# Patient Record
Sex: MALE | Employment: UNEMPLOYED | URBAN - METROPOLITAN AREA
[De-identification: names, ages, dates, MRNs, and addresses within clinical notes are randomized per-mention and may not be internally consistent; named-entity substitution may affect disease eponyms.]

---

## 2018-06-08 ENCOUNTER — APPOINTMENT (OUTPATIENT)
Dept: ULTRASOUND IMAGING | Age: 43
DRG: 872 | End: 2018-06-08
Attending: HOSPITALIST
Payer: SUBSIDIZED

## 2018-06-08 ENCOUNTER — APPOINTMENT (OUTPATIENT)
Dept: GENERAL RADIOLOGY | Age: 43
DRG: 872 | End: 2018-06-08
Attending: EMERGENCY MEDICINE
Payer: SUBSIDIZED

## 2018-06-08 ENCOUNTER — HOSPITAL ENCOUNTER (INPATIENT)
Age: 43
LOS: 2 days | Discharge: HOME | DRG: 872 | End: 2018-06-10
Attending: EMERGENCY MEDICINE | Admitting: HOSPITALIST
Payer: SUBSIDIZED

## 2018-06-08 ENCOUNTER — APPOINTMENT (OUTPATIENT)
Dept: MRI IMAGING | Age: 43
DRG: 872 | End: 2018-06-08
Attending: HOSPITALIST
Payer: SUBSIDIZED

## 2018-06-08 DIAGNOSIS — L03.116 CELLULITIS OF LEFT LOWER EXTREMITY: Primary | ICD-10-CM

## 2018-06-08 DIAGNOSIS — R03.0 ELEVATED BLOOD PRESSURE READING: ICD-10-CM

## 2018-06-08 PROBLEM — S92.902A FOOT FRACTURE, LEFT: Status: ACTIVE | Noted: 2018-06-08

## 2018-06-08 LAB
ALBUMIN SERPL-MCNC: 1.6 G/DL (ref 3.5–5)
ALBUMIN/GLOB SERPL: 0.4 {RATIO} (ref 1.1–2.2)
ALP SERPL-CCNC: 138 U/L (ref 45–117)
ALT SERPL-CCNC: 15 U/L (ref 12–78)
AMPHET UR QL SCN: NEGATIVE
ANION GAP SERPL CALC-SCNC: 8 MMOL/L (ref 5–15)
APPEARANCE UR: CLEAR
AST SERPL-CCNC: 21 U/L (ref 15–37)
BACTERIA URNS QL MICRO: NEGATIVE /HPF
BARBITURATES UR QL SCN: NEGATIVE
BASOPHILS # BLD: 0.1 K/UL (ref 0–0.1)
BASOPHILS NFR BLD: 1 % (ref 0–1)
BENZODIAZ UR QL: NEGATIVE
BILIRUB SERPL-MCNC: 0.3 MG/DL (ref 0.2–1)
BILIRUB UR QL: NEGATIVE
BUN SERPL-MCNC: 18 MG/DL (ref 6–20)
BUN/CREAT SERPL: 14 (ref 12–20)
CALCIUM SERPL-MCNC: 8.3 MG/DL (ref 8.5–10.1)
CANNABINOIDS UR QL SCN: NEGATIVE
CHLORIDE SERPL-SCNC: 109 MMOL/L (ref 97–108)
CO2 SERPL-SCNC: 25 MMOL/L (ref 21–32)
COCAINE UR QL SCN: NEGATIVE
COLOR UR: ABNORMAL
CREAT SERPL-MCNC: 1.3 MG/DL (ref 0.7–1.3)
DIFFERENTIAL METHOD BLD: ABNORMAL
DRUG SCRN COMMENT,DRGCM: NORMAL
EOSINOPHIL # BLD: 0.4 K/UL (ref 0–0.4)
EOSINOPHIL NFR BLD: 3 % (ref 0–7)
EPITH CASTS URNS QL MICRO: ABNORMAL /LPF
ERYTHROCYTE [DISTWIDTH] IN BLOOD BY AUTOMATED COUNT: 15.5 % (ref 11.5–14.5)
GLOBULIN SER CALC-MCNC: 4.1 G/DL (ref 2–4)
GLUCOSE BLD STRIP.AUTO-MCNC: 136 MG/DL (ref 65–100)
GLUCOSE BLD STRIP.AUTO-MCNC: 162 MG/DL (ref 65–100)
GLUCOSE BLD STRIP.AUTO-MCNC: 171 MG/DL (ref 65–100)
GLUCOSE SERPL-MCNC: 180 MG/DL (ref 65–100)
GLUCOSE UR STRIP.AUTO-MCNC: 250 MG/DL
HCT VFR BLD AUTO: 38.9 % (ref 36.6–50.3)
HGB BLD-MCNC: 12.6 G/DL (ref 12.1–17)
HGB UR QL STRIP: ABNORMAL
IMM GRANULOCYTES # BLD: 0.1 K/UL (ref 0–0.04)
IMM GRANULOCYTES NFR BLD AUTO: 0 % (ref 0–0.5)
KETONES UR QL STRIP.AUTO: NEGATIVE MG/DL
LACTATE SERPL-SCNC: 0.8 MMOL/L (ref 0.4–2)
LEUKOCYTE ESTERASE UR QL STRIP.AUTO: NEGATIVE
LYMPHOCYTES # BLD: 1.8 K/UL (ref 0.8–3.5)
LYMPHOCYTES NFR BLD: 13 % (ref 12–49)
MCH RBC QN AUTO: 29.4 PG (ref 26–34)
MCHC RBC AUTO-ENTMCNC: 32.4 G/DL (ref 30–36.5)
MCV RBC AUTO: 90.7 FL (ref 80–99)
METHADONE UR QL: NEGATIVE
MONOCYTES # BLD: 1.4 K/UL (ref 0–1)
MONOCYTES NFR BLD: 10 % (ref 5–13)
NEUTS SEG # BLD: 10.3 K/UL (ref 1.8–8)
NEUTS SEG NFR BLD: 73 % (ref 32–75)
NITRITE UR QL STRIP.AUTO: NEGATIVE
NRBC # BLD: 0 K/UL (ref 0–0.01)
NRBC BLD-RTO: 0 PER 100 WBC
OPIATES UR QL: NEGATIVE
PCP UR QL: NEGATIVE
PH UR STRIP: 6.5 [PH] (ref 5–8)
PLATELET # BLD AUTO: 285 K/UL (ref 150–400)
PMV BLD AUTO: 10.9 FL (ref 8.9–12.9)
POTASSIUM SERPL-SCNC: 3.9 MMOL/L (ref 3.5–5.1)
PROT SERPL-MCNC: 5.7 G/DL (ref 6.4–8.2)
PROT UR STRIP-MCNC: >300 MG/DL
RBC # BLD AUTO: 4.29 M/UL (ref 4.1–5.7)
RBC #/AREA URNS HPF: ABNORMAL /HPF (ref 0–5)
SERVICE CMNT-IMP: ABNORMAL
SODIUM SERPL-SCNC: 142 MMOL/L (ref 136–145)
SP GR UR REFRACTOMETRY: 1.02 (ref 1–1.03)
UA: UC IF INDICATED,UAUC: ABNORMAL
UROBILINOGEN UR QL STRIP.AUTO: 0.2 EU/DL (ref 0.2–1)
WBC # BLD AUTO: 14.1 K/UL (ref 4.1–11.1)
WBC URNS QL MICRO: ABNORMAL /HPF (ref 0–4)

## 2018-06-08 PROCEDURE — 36415 COLL VENOUS BLD VENIPUNCTURE: CPT

## 2018-06-08 PROCEDURE — 74011250636 HC RX REV CODE- 250/636: Performed by: EMERGENCY MEDICINE

## 2018-06-08 PROCEDURE — 74011250637 HC RX REV CODE- 250/637: Performed by: EMERGENCY MEDICINE

## 2018-06-08 PROCEDURE — 80307 DRUG TEST PRSMV CHEM ANLYZR: CPT | Performed by: HOSPITALIST

## 2018-06-08 PROCEDURE — 74011000258 HC RX REV CODE- 258: Performed by: HOSPITALIST

## 2018-06-08 PROCEDURE — 65660000000 HC RM CCU STEPDOWN

## 2018-06-08 PROCEDURE — 85025 COMPLETE CBC W/AUTO DIFF WBC: CPT

## 2018-06-08 PROCEDURE — 74011000250 HC RX REV CODE- 250: Performed by: EMERGENCY MEDICINE

## 2018-06-08 PROCEDURE — 82962 GLUCOSE BLOOD TEST: CPT

## 2018-06-08 PROCEDURE — 74011250636 HC RX REV CODE- 250/636: Performed by: HOSPITALIST

## 2018-06-08 PROCEDURE — 74011250637 HC RX REV CODE- 250/637: Performed by: HOSPITALIST

## 2018-06-08 PROCEDURE — A9575 INJ GADOTERATE MEGLUMI 0.1ML: HCPCS | Performed by: EMERGENCY MEDICINE

## 2018-06-08 PROCEDURE — 93971 EXTREMITY STUDY: CPT

## 2018-06-08 PROCEDURE — 87040 BLOOD CULTURE FOR BACTERIA: CPT

## 2018-06-08 PROCEDURE — 81001 URINALYSIS AUTO W/SCOPE: CPT | Performed by: HOSPITALIST

## 2018-06-08 PROCEDURE — 99285 EMERGENCY DEPT VISIT HI MDM: CPT

## 2018-06-08 PROCEDURE — 73630 X-RAY EXAM OF FOOT: CPT

## 2018-06-08 PROCEDURE — 83605 ASSAY OF LACTIC ACID: CPT

## 2018-06-08 PROCEDURE — 73720 MRI LWR EXTREMITY W/O&W/DYE: CPT

## 2018-06-08 PROCEDURE — 96375 TX/PRO/DX INJ NEW DRUG ADDON: CPT

## 2018-06-08 PROCEDURE — 80053 COMPREHEN METABOLIC PANEL: CPT

## 2018-06-08 PROCEDURE — 96365 THER/PROPH/DIAG IV INF INIT: CPT

## 2018-06-08 RX ORDER — MAGNESIUM SULFATE 100 %
4 CRYSTALS MISCELLANEOUS AS NEEDED
Status: DISCONTINUED | OUTPATIENT
Start: 2018-06-08 | End: 2018-06-10 | Stop reason: HOSPADM

## 2018-06-08 RX ORDER — VALSARTAN 40 MG/1
40 TABLET ORAL DAILY
Status: DISCONTINUED | OUTPATIENT
Start: 2018-06-09 | End: 2018-06-10 | Stop reason: HOSPADM

## 2018-06-08 RX ORDER — METOPROLOL TARTRATE 50 MG/1
100 TABLET ORAL EVERY 8 HOURS
Status: DISCONTINUED | OUTPATIENT
Start: 2018-06-08 | End: 2018-06-08

## 2018-06-08 RX ORDER — SODIUM CHLORIDE 0.9 % (FLUSH) 0.9 %
5-10 SYRINGE (ML) INJECTION AS NEEDED
Status: DISCONTINUED | OUTPATIENT
Start: 2018-06-08 | End: 2018-06-10 | Stop reason: HOSPADM

## 2018-06-08 RX ORDER — DOCUSATE SODIUM 100 MG/1
100 CAPSULE, LIQUID FILLED ORAL 2 TIMES DAILY
Status: DISCONTINUED | OUTPATIENT
Start: 2018-06-08 | End: 2018-06-10 | Stop reason: HOSPADM

## 2018-06-08 RX ORDER — ACETAMINOPHEN 325 MG/1
650 TABLET ORAL
Status: DISCONTINUED | OUTPATIENT
Start: 2018-06-08 | End: 2018-06-10 | Stop reason: HOSPADM

## 2018-06-08 RX ORDER — METRONIDAZOLE 500 MG/100ML
500 INJECTION, SOLUTION INTRAVENOUS
Status: COMPLETED | OUTPATIENT
Start: 2018-06-08 | End: 2018-06-08

## 2018-06-08 RX ORDER — METOPROLOL TARTRATE 50 MG/1
50 TABLET ORAL
Status: COMPLETED | OUTPATIENT
Start: 2018-06-08 | End: 2018-06-08

## 2018-06-08 RX ORDER — CLINDAMYCIN PHOSPHATE 900 MG/50ML
900 INJECTION INTRAVENOUS
Status: COMPLETED | OUTPATIENT
Start: 2018-06-08 | End: 2018-06-08

## 2018-06-08 RX ORDER — HYDRALAZINE HYDROCHLORIDE 20 MG/ML
20 INJECTION INTRAMUSCULAR; INTRAVENOUS
Status: COMPLETED | OUTPATIENT
Start: 2018-06-08 | End: 2018-06-08

## 2018-06-08 RX ORDER — VANCOMYCIN HYDROCHLORIDE
1250 EVERY 12 HOURS
Status: DISCONTINUED | OUTPATIENT
Start: 2018-06-09 | End: 2018-06-10

## 2018-06-08 RX ORDER — SODIUM CHLORIDE 0.9 % (FLUSH) 0.9 %
5-10 SYRINGE (ML) INJECTION EVERY 8 HOURS
Status: DISCONTINUED | OUTPATIENT
Start: 2018-06-08 | End: 2018-06-10 | Stop reason: HOSPADM

## 2018-06-08 RX ORDER — METFORMIN HYDROCHLORIDE 850 MG/1
850 TABLET ORAL DAILY
COMMUNITY
End: 2019-06-10 | Stop reason: ALTCHOICE

## 2018-06-08 RX ORDER — OXYCODONE HYDROCHLORIDE 5 MG/1
5 TABLET ORAL
Status: COMPLETED | OUTPATIENT
Start: 2018-06-08 | End: 2018-06-08

## 2018-06-08 RX ORDER — HYDRALAZINE HYDROCHLORIDE 20 MG/ML
20 INJECTION INTRAMUSCULAR; INTRAVENOUS
Status: DISCONTINUED | OUTPATIENT
Start: 2018-06-08 | End: 2018-06-10 | Stop reason: HOSPADM

## 2018-06-08 RX ORDER — ENOXAPARIN SODIUM 100 MG/ML
40 INJECTION SUBCUTANEOUS EVERY 24 HOURS
Status: DISCONTINUED | OUTPATIENT
Start: 2018-06-08 | End: 2018-06-10 | Stop reason: HOSPADM

## 2018-06-08 RX ORDER — GLYBURIDE 5 MG/1
2.5 TABLET ORAL 2 TIMES DAILY
Status: DISCONTINUED | OUTPATIENT
Start: 2018-06-08 | End: 2018-06-10 | Stop reason: HOSPADM

## 2018-06-08 RX ORDER — CARVEDILOL 25 MG/1
25 TABLET ORAL 2 TIMES DAILY
COMMUNITY
End: 2019-06-10 | Stop reason: SDUPTHER

## 2018-06-08 RX ORDER — SODIUM CHLORIDE 9 MG/ML
100 INJECTION, SOLUTION INTRAVENOUS CONTINUOUS
Status: DISPENSED | OUTPATIENT
Start: 2018-06-08 | End: 2018-06-09

## 2018-06-08 RX ORDER — DIPHENHYDRAMINE HCL 25 MG
25 TABLET ORAL
COMMUNITY
End: 2019-06-10

## 2018-06-08 RX ORDER — DEXTROSE 50 % IN WATER (D50W) INTRAVENOUS SYRINGE
12.5-25 AS NEEDED
Status: DISCONTINUED | OUTPATIENT
Start: 2018-06-08 | End: 2018-06-10 | Stop reason: HOSPADM

## 2018-06-08 RX ORDER — INSULIN LISPRO 100 [IU]/ML
INJECTION, SOLUTION INTRAVENOUS; SUBCUTANEOUS
Status: DISCONTINUED | OUTPATIENT
Start: 2018-06-08 | End: 2018-06-10 | Stop reason: HOSPADM

## 2018-06-08 RX ORDER — TRAMADOL HYDROCHLORIDE 50 MG/1
50 TABLET ORAL
Status: COMPLETED | OUTPATIENT
Start: 2018-06-08 | End: 2018-06-08

## 2018-06-08 RX ORDER — METOPROLOL TARTRATE 50 MG/1
50 TABLET ORAL EVERY 8 HOURS
Status: DISCONTINUED | OUTPATIENT
Start: 2018-06-08 | End: 2018-06-09

## 2018-06-08 RX ORDER — GADOTERATE MEGLUMINE 376.9 MG/ML
20 INJECTION INTRAVENOUS
Status: COMPLETED | OUTPATIENT
Start: 2018-06-08 | End: 2018-06-08

## 2018-06-08 RX ORDER — ONDANSETRON 2 MG/ML
4 INJECTION INTRAMUSCULAR; INTRAVENOUS
Status: DISCONTINUED | OUTPATIENT
Start: 2018-06-08 | End: 2018-06-09

## 2018-06-08 RX ORDER — FUROSEMIDE 20 MG/1
20 TABLET ORAL DAILY
Status: ON HOLD | COMMUNITY
End: 2018-06-10

## 2018-06-08 RX ORDER — IBUPROFEN 200 MG
1 TABLET ORAL DAILY
Status: DISCONTINUED | OUTPATIENT
Start: 2018-06-09 | End: 2018-06-10 | Stop reason: HOSPADM

## 2018-06-08 RX ORDER — OXYCODONE HYDROCHLORIDE 5 MG/1
10 TABLET ORAL
Status: DISCONTINUED | OUTPATIENT
Start: 2018-06-08 | End: 2018-06-10 | Stop reason: HOSPADM

## 2018-06-08 RX ORDER — SULFAMETHOXAZOLE AND TRIMETHOPRIM 800; 160 MG/1; MG/1
1 TABLET ORAL 2 TIMES DAILY
COMMUNITY
End: 2018-06-10

## 2018-06-08 RX ADMIN — METOPROLOL TARTRATE 50 MG: 50 TABLET ORAL at 21:17

## 2018-06-08 RX ADMIN — VANCOMYCIN HYDROCHLORIDE 2500 MG: 10 INJECTION, POWDER, LYOPHILIZED, FOR SOLUTION INTRAVENOUS at 17:21

## 2018-06-08 RX ADMIN — OXYCODONE HYDROCHLORIDE 5 MG: 5 TABLET ORAL at 10:12

## 2018-06-08 RX ADMIN — METRONIDAZOLE 500 MG: 500 INJECTION, SOLUTION INTRAVENOUS at 11:43

## 2018-06-08 RX ADMIN — CLINDAMYCIN PHOSPHATE 900 MG: 18 INJECTION, SOLUTION INTRAMUSCULAR; INTRAVENOUS at 10:12

## 2018-06-08 RX ADMIN — DOCUSATE SODIUM 100 MG: 100 CAPSULE, LIQUID FILLED ORAL at 15:22

## 2018-06-08 RX ADMIN — ENOXAPARIN SODIUM 40 MG: 100 INJECTION SUBCUTANEOUS at 15:23

## 2018-06-08 RX ADMIN — METOPROLOL TARTRATE 50 MG: 50 TABLET ORAL at 10:12

## 2018-06-08 RX ADMIN — Medication 10 ML: at 21:17

## 2018-06-08 RX ADMIN — Medication 10 ML: at 15:23

## 2018-06-08 RX ADMIN — GADOTERATE MEGLUMINE 20 ML: 376.9 INJECTION INTRAVENOUS at 14:36

## 2018-06-08 RX ADMIN — GLYBURIDE 2.5 MG: 5 TABLET ORAL at 17:25

## 2018-06-08 RX ADMIN — PIPERACILLIN SODIUM,TAZOBACTAM SODIUM 3.38 G: 3; .375 INJECTION, POWDER, FOR SOLUTION INTRAVENOUS at 22:58

## 2018-06-08 RX ADMIN — Medication 10 ML: at 18:57

## 2018-06-08 RX ADMIN — HYDRALAZINE HYDROCHLORIDE 20 MG: 20 INJECTION INTRAMUSCULAR; INTRAVENOUS at 18:57

## 2018-06-08 RX ADMIN — TRAMADOL HYDROCHLORIDE 50 MG: 50 TABLET, FILM COATED ORAL at 11:42

## 2018-06-08 RX ADMIN — OXYCODONE HYDROCHLORIDE 10 MG: 5 TABLET ORAL at 21:17

## 2018-06-08 RX ADMIN — PIPERACILLIN SODIUM,TAZOBACTAM SODIUM 3.38 G: 3; .375 INJECTION, POWDER, FOR SOLUTION INTRAVENOUS at 15:32

## 2018-06-08 RX ADMIN — METOPROLOL TARTRATE 50 MG: 50 TABLET ORAL at 15:39

## 2018-06-08 RX ADMIN — HYDRALAZINE HYDROCHLORIDE 20 MG: 20 INJECTION INTRAMUSCULAR; INTRAVENOUS at 11:42

## 2018-06-08 RX ADMIN — OXYCODONE HYDROCHLORIDE 10 MG: 5 TABLET ORAL at 15:22

## 2018-06-08 RX ADMIN — SODIUM CHLORIDE 100 ML/HR: 900 INJECTION, SOLUTION INTRAVENOUS at 15:28

## 2018-06-08 RX ADMIN — DOCUSATE SODIUM 100 MG: 100 CAPSULE, LIQUID FILLED ORAL at 17:25

## 2018-06-08 NOTE — IP AVS SNAPSHOT
[address] [facility] 
[phone] Patient: Rocky Riggs MRN: [id number] NABOR:[id number] About your hospitalization You were admitted on:  June 8, 2018 You last received care in the:  [facility] CARDIOPULMONARY CARE You were discharged on:  Amanda 10, 2018 Why you were hospitalized Your primary diagnosis was:  Left Leg Cellulitis Your diagnoses also included:  Accelerated Hypertension, Foot Fracture, Left, Sepsis (Hcc), Allen (Acute Kidney Injury) ([facility]) Follow-up Information Follow up With Details Comments Contact Info None In 1 week Please call PCP office of your choice to schedule new patient appointment. None [phone] Patient stated that they have no PCP Hamida Resendez DPM In 4 days Please schedule Podiatry follow-up appointment. [facility] [address] 
[phone] Discharge Orders None A check guadalupe indicates which time of day the medication should be taken. My Medications START taking these medications Instructions Each Dose to Equal  
 Morning Noon Evening Bedtime  
 amoxicillin-clavulanate 500-125 mg per tablet Commonly known as:  AUGMENTIN Your last dose was: Your next dose is: Take 1 Tab by mouth two (2) times a day for 5 days. 1 Tab CHANGE how you take these medications Instructions Each Dose to Equal  
 Morning Noon Evening Bedtime  
 furosemide 40 mg tablet Commonly known as:  LASIX What changed:   
- medication strength 
- how much to take Your last dose was: Your next dose is: Take 1 Tab by mouth daily. 40 mg CONTINUE taking these medications Instructions Each Dose to Equal  
 Morning Noon Evening Bedtime BENADRYL ALLERGY 25 mg tablet Generic drug:  diphenhydrAMINE Your last dose was: Your next dose is: Take 25 mg by mouth two (2) times a day. 25 mg  
    
   
   
   
  
 carvedilol 25 mg tablet Commonly known as:  Mariola Arenas Your last dose was: Your next dose is: Take 25 mg by mouth daily. 25 mg  
    
   
   
   
  
 metFORMIN 850 mg tablet Commonly known as:  GLUCOPHAGE Your last dose was: Your next dose is: Take 850 mg by mouth two (2) times daily (with meals). 850 mg  
    
   
   
   
  
  
STOP taking these medications BACTRIM -800 mg per tablet Generic drug:  trimethoprim-sulfamethoxazole Where to Get Your Medications Information on where to get these meds will be given to you by the nurse or doctor. ! Ask your nurse or doctor about these medications  
  amoxicillin-clavulanate 500-125 mg per tablet  
 furosemide 40 mg tablet Discharge Instructions HOSPITALIST DISCHARGE INSTRUCTIONS 
 
NAME: Rosita Dent :  1975 MRN:  [id number] Date/Time:  6/10/2018 9:41 AM 
 
ADMIT DATE: 2018 DISCHARGE DATE: 6/10/2018 DISCHARGE DIAGNOSIS: 
Left lower leg and foot cellulitis: improved Charcot foot Osteomyelitis ruled out Sepsis, resolved Accelerated hypertension, poa 
CKD stage II/III   
DM type 2, A1c 6.8 Hepatitis C Tobacco abuse Hx alcohol dependence MEDICATIONS: 
As per medication reconciliation  list 
· It is important that you take the medication exactly as they are prescribed. · Keep your medication in the bottles provided by the pharmacist and keep a list of the medication names, dosages, and times to be taken in your wallet. · Do not take other medications without consulting your doctor. · Also get an over the counter probiotic to help prevent antibiotic associated diarrhea Pain Management: Use tylenol (acetaminophen as needed for pain) What to do at [facility] Recommended diet:  Diabetic Diet Recommended activity: Activity as tolerated If you have questions regarding the hospital related prescriptions or hospital related issues please call Jah James at . If you experience any of the following symptoms then please call your primary care physician or return to the emergency room if you cannot get hold of your doctor: 
Fever, chills, nausea, vomiting, diarrhea, change in mentation, falling, bleeding, shortness of breath or any other concerning symptoms. Follow Up: 
Complete you [facility] care card application and establish first available care with a PCP. Dr. Mary Thompson would like to see you in her office next week. I have given you her information. Please get your RANCHO reports from [facility] prior to this appointment. Information obtained by : 
I understand that if any problems occur once I am at home I am to contact my physician. I understand and acknowledge receipt of the instructions indicated above. Physician's or R.N.'s Signature                                                                  Date/Time Patient or Representative Signature                                                          Date/Time [employer] Announcement We are excited to announce that we are making your provider's discharge notes available to you in [employer]. You will see these notes when they are completed and signed by the physician that discharged you from your recent hospital stay.   If you have any questions or concerns about any information you see in [employer], please call the [facility] Department where you were seen or reach out to your Primary Care Provider for more information about your plan of care. Introducing [facility]! Leland Leon introduces [employer] patient portal. Now you can access parts of your medical record, email your doctor's office, and request medication refills online. 1. In your internet browser, go to [email or web address] 2. Click on the First Time User? Click Here link in the Sign In box. You will see the New Member Sign Up page. 3. Enter your [employer] Access Code exactly as it appears below. You will not need to use this code after youve completed the sign-up process. If you do not sign up before the expiration date, you must request a new code. · [employer] Access Code: [email or web address] Expires: 9/6/2018  8:31 AM 
 
4. Enter the last four digits of your Social Security Number (xxxx) and Date of Birth (mm/dd/yyyy) as indicated and click Submit. You will be taken to the next sign-up page. 5. Create a [employer] ID. This will be your [employer] login ID and cannot be changed, so think of one that is secure and easy to remember. 6. Create a [employer] password. You can change your password at any time. 7. Enter your Password Reset Question and Answer. This can be used at a later time if you forget your password. 8. Enter your e-mail address. You will receive e-mail notification when new information is available in [address] 9. Click Sign Up. You can now view and download portions of your medical record. 10. Click the Download Summary menu link to download a portable copy of your medical information. If you have questions, please visit the Frequently Asked Questions section of the [employer] website. Remember, [employer] is NOT to be used for urgent needs. For medical emergencies, dial 911. Now available from your iPhone and Android! Introducing Kevin Roque As a [employer] patient, I wanted to make you aware of our electronic visit tool called Kevin Prattjayleeniam. [employer] 24/7 allows you to connect within minutes with a medical provider 24 hours a day, seven days a week via a mobile device or tablet or logging into a secure website from your computer. You can access Kevin Myersfin from anywhere in the [place]. A virtual visit might be right for you when you have a simple condition and feel like you just dont want to get out of bed, or cant get away from work for an appointment, when your regular [employer] provider is not available (evenings, weekends or holidays), or when youre out of town and need minor care. Electronic visits cost only $49 and if the [employer] 24/7 provider determines a prescription is needed to treat your condition, one can be electronically transmitted to a nearby pharmacy*. Please take a moment to enroll today if you have not already done so. The enrollment process is free and takes just a few minutes. To enroll, please download the [employer] 24/7 marycarmen to your tablet or phone, or visit [email or web address] to enroll on your computer. And, as an [address] [place] [address] patient with a [employer] account, the results of your visits will be scanned into your electronic medical record and your primary care provider will be able to view the scanned results. We urge you to continue to see your regular [employer] provider for your ongoing medical care. And while your primary care provider may not be the one available when you seek a Kevin Prattjayleenfin virtual visit, the peace of mind you get from getting a real diagnosis real time can be priceless. For more information on Kevin Terencenita, view our Frequently Asked Questions (FAQs) at [email or web address]. Sincerely, 
 
Roseann Vines MD 
Chief Medical Officer [facility] *:  certain medications cannot be prescribed via Kevin Roque Unresulted Labs-Please follow up with your PCP about these lab tests Order Current Status CULTURE, BLOOD, PAIRED Preliminary result Providers Seen During Your Hospitalization Provider Specialty Primary office phone Caryn Hughes. Mao Oleary MD Emergency Medicine [phone] Sandra Landers MD Internal Medicine [phone] Gilles Park MD Internal Medicine [phone] Your Primary Care Physician (PCP) Primary Care Physician Office Phone Office Fax NONE ** None ** ** None ** You are allergic to the following Allergen Reactions Lisinopril Photosensitivity Vertigo Recent Documentation Height Weight BMI Smoking Status 1.753 m 97.2 kg 31.64 kg/m2 Current Every Day Smoker Emergency Contacts Name Discharge Info Relation Home Work Mobile Ann Moseley [address] [employer] [3] Other Relative [6] [phone] Patient Belongings The following personal items are in your possession at time of discharge: 
  Dental Appliances: At home  Visual Aid: None      Home Medications: None      Clothing: At bedside    Other Valuables: At bedside, Other (comment) (cellphone and wallet) Please provide this summary of care documentation to your next provider. Signatures-by signing, you are acknowledging that this After Visit Summary has been reviewed with you and you have received a copy. Patient Signature:  ____________________________________________________________ Date:  ____________________________________________________________  
  
Kelli Stock Provider Signature:  ____________________________________________________________ Date:  ____________________________________________________________

## 2018-06-08 NOTE — ED NOTES
Pt rounded on and is resting in bed - asked for urinal and RN provided urinal to pt. Pt was wanting to know if he will be admitted - this RN notified that it sounded initially like he would be, but will follow up with LIP. Currently rating pain at 6/10.  Denies any further needs at time

## 2018-06-08 NOTE — PROGRESS NOTES
Pharmacy Clarification of Prior to Admission Medication Regimen     The patient was interviewed regarding clarification of the prior to admission medication regimen and was questioned regarding use of any other inhalers, topical products, over the counter medications, herbal medications, vitamin products or ophthalmic/nasal/otic medication use. Information Obtained From: Patient, [address]    Pertinent Pharmacy Findings:   carvedilol (COREG) 25 mg tablet: medication is prescribed BID, patient is taking medication daily   furosemide (LASIX) 20 mg tablet, metFORMIN (GLUCOPHAGE) 850 mg tablet, trimethoprim-sulfamethoxazole (BACTRIM DS) 160-800 mg per tablet: patient was discharged with these medications last week, but did not take any of them     PTA medication list was corrected to the following:     Prior to Admission Medications   Prescriptions Last Dose Informant Patient Reported? Taking?   carvedilol (COREG) 25 mg tablet 6/7/2018 at Unknown time Self Yes Yes   Sig: Take 25 mg by mouth daily. diphenhydrAMINE (BENADRYL ALLERGY) 25 mg tablet 6/6/2018 at Unknown time Self Yes Yes   Sig: Take 25 mg by mouth two (2) times a day. furosemide (LASIX) 20 mg tablet Not Taking at Unknown time Self Yes No   Sig: Take 20 mg by mouth daily. metFORMIN (GLUCOPHAGE) 850 mg tablet Not Taking at Unknown time Self Yes No   Sig: Take 850 mg by mouth two (2) times daily (with meals). trimethoprim-sulfamethoxazole (BACTRIM DS) 160-800 mg per tablet Not Taking at Unknown time Self Yes No   Sig: Take 1 Tab by mouth two (2) times a day. Facility-Administered Medications: None          Thank you,  Antonia Briones. NESSA Candidate 2019

## 2018-06-08 NOTE — ED PROVIDER NOTES
Patient is a 44 y.o. male presenting with skin infection. The history is provided by the patient. Skin Infection   This is a new problem. The current episode started more than 1 week ago. The problem occurs constantly. The problem has been gradually worsening. Pertinent negatives include no chest pain, no abdominal pain and no shortness of breath. Negin Clifford is a 38yo M with PMH of HTN (poorly controlled), DM, cirrhosis 2/2 hepatitis C, pancreatitis who presents with complaints of pain, swelling, and infection to left lower extremity. Denies fevers, chills, weakness. He endorses presenting to [address] [place] [address] just over a week ago with similar symptoms and was admitted for infection of LLE. He notes he was treated with antibiotics, diuresis, analgesics with mild improvement of symptoms. He was discharged after a 2-day hospital stay with a course of Bactrim however reported nausea/vomiting associated with Bactrim and states he has not taken this medication in one week. No nausea, vomiting since that time. Past Medical History:   Diagnosis Date    Diabetes ([address]     Hepatitis C     Hypertension     Liver disease     Cirrohsis and hep C    Pancreatitis        Past Surgical History:   Procedure Laterality Date    HX APPENDECTOMY           No family history on file. Social History     Social History    Marital status: SINGLE     Spouse name: N/A    Number of children: N/A    Years of education: N/A     Occupational History    Not on file.      Social History Main Topics    Smoking status: Current Every Day Smoker     Packs/day: 1.50    Smokeless tobacco: Not on file    Alcohol use 3.0 oz/week     6 Cans of beer per week    Drug use: Not on file    Sexual activity: Not on file     Other Topics Concern    Not on file     Social History Narrative    No narrative on file         ALLERGIES: Lisinopril    Review of Systems   Constitutional: Negative for activity change, appetite change and fever.   Respiratory: Negative for cough, shortness of breath and wheezing. Cardiovascular: Positive for leg swelling. Negative for chest pain. Gastrointestinal: Negative for abdominal pain, diarrhea, nausea and vomiting. Endocrine: Negative for polydipsia and polyuria. Genitourinary: Negative for dysuria and hematuria. Musculoskeletal: Positive for joint swelling (LLE). Neurological: Negative for dizziness and weakness. Vitals:    06/08/18 0834 06/08/18 0907   BP: (!) 224/120    Pulse: (!) 104    Resp: 16    Temp: 98.5 °F (36.9 °C)    SpO2: 100% 100%   Weight: 97.2 kg (214 lb 4.6 oz)    Height: 5' 9\" (1.753 m)             Physical Exam   Constitutional: He is oriented to person, place, and time. He appears well-developed and well-nourished. No distress. HENT:   Head: Normocephalic and atraumatic. Eyes: Conjunctivae and EOM are normal.   Neck: No JVD present. Cardiovascular: Regular rhythm and normal heart sounds. Tachycardia present. Pulses:       Dorsalis pedis pulses are 2+ on the right side   4+ pitting edema to LLE, 2+ to RLE   Pulmonary/Chest: Effort normal and breath sounds normal. No respiratory distress. He has no wheezes. Abdominal: Soft. He exhibits no distension. Musculoskeletal:   Swelling of LLE below the knee, with TTP of left foot diffusely. Skin is warm to the touch, mildly erythematous, no obvious wound or ulcer. Sensation diminished to all areas of foot, reportedly chronic. Intact motor of toes   Neurological: He is alert and oriented to person, place, and time. Skin: He is not diaphoretic. Psychiatric: He has a normal mood and affect. MDM  Number of Diagnoses or Management Options  Cellulitis of left lower extremity: established and worsening  Elevated blood pressure reading: established and improving  Diagnosis management comments: 38yo M with PMH of poorly controlled DM, HTN who presents with persistent soft tissue infection and pain of LLE. Likely worsening symptoms due to medication non-compliance. Patient is afebrile but is tachycardic and with evidence of infection so will treat for suspected sepsis. Will treat with analgesia, antibiotics. Labs to include CBC, CMP, lactate, blood cultures. Plain film of the LLE to evaluate for bony abnormality, subcutaneous air, evidence of osteomyelitis. Patient does have a history of provoked DVT s/p IVC filter (not on [facility]), however he reportedly had a duplex at OSH which was negative for clot at onset of symptoms. While patient states his BP is actually lower than usual, it is still concerning so will give his home dose of Metoprolol and reevaluate. Amount and/or Complexity of Data Reviewed  Clinical lab tests: ordered and reviewed  Tests in the radiology section of CPT®: ordered and reviewed  Discuss the patient with other providers: yes    Patient Progress  Patient progress: stable    Recent Results (from the past 12 hour(s))   GLUCOSE, POC    Collection Time: 06/08/18  8:50 AM   Result Value Ref Range    Glucose (POC) 171 (H) 65 - 100 mg/dL    Performed by [facility]    METABOLIC PANEL, COMPREHENSIVE    Collection Time: 06/08/18 10:03 AM   Result Value Ref Range    Sodium 142 136 - 145 mmol/L    Potassium 3.9 3.5 - 5.1 mmol/L    Chloride 109 (H) 97 - 108 mmol/L    CO2 25 21 - 32 mmol/L    Anion gap 8 5 - 15 mmol/L    Glucose 180 (H) 65 - 100 mg/dL    BUN 18 6 - 20 MG/DL    Creatinine 1.30 0.70 - 1.30 MG/DL    BUN/Creatinine ratio 14 12 - 20      GFR est AA >60 >60 ml/min/1.73m2    GFR est non-AA >60 >60 ml/min/1.73m2    Calcium 8.3 (L) 8.5 - 10.1 MG/DL    Bilirubin, total 0.3 0.2 - 1.0 MG/DL    ALT (SGPT) 15 12 - 78 U/L    AST (SGOT) 21 15 - 37 U/L    Alk.  phosphatase 138 (H) 45 - 117 U/L    Protein, total 5.7 (L) 6.4 - 8.2 g/dL    Albumin 1.6 (L) 3.5 - 5.0 g/dL    Globulin 4.1 (H) 2.0 - 4.0 g/dL    A-G Ratio 0.4 (L) 1.1 - 2.2     CBC WITH AUTOMATED DIFF    Collection Time: 06/08/18 10:03 AM Result Value Ref Range    WBC 14.1 (H) 4.1 - 11.1 K/uL    RBC 4.29 4. 10 - 5.70 M/uL    HGB 12.6 12.1 - 17.0 g/dL    HCT 38.9 36.6 - 50.3 %    MCV 90.7 80.0 - 99.0 FL    MCH 29.4 26.0 - 34.0 PG    MCHC 32.4 30.0 - 36.5 g/dL    RDW 15.5 (H) 11.5 - 14.5 %    PLATELET 936 383 - 894 K/uL    MPV 10.9 8.9 - 12.9 FL    NRBC 0.0 0  WBC    ABSOLUTE NRBC 0.00 0.00 - 0.01 K/uL    NEUTROPHILS 73 32 - 75 %    LYMPHOCYTES 13 12 - 49 %    MONOCYTES 10 5 - 13 %    EOSINOPHILS 3 0 - 7 %    BASOPHILS 1 0 - 1 %    IMMATURE GRANULOCYTES 0 0.0 - 0.5 %    ABS. NEUTROPHILS 10.3 (H) 1.8 - 8.0 K/UL    ABS. LYMPHOCYTES 1.8 0.8 - 3.5 K/UL    ABS. MONOCYTES 1.4 (H) 0.0 - 1.0 K/UL    ABS. EOSINOPHILS 0.4 0.0 - 0.4 K/UL    ABS. BASOPHILS 0.1 0.0 - 0.1 K/UL    ABS. IMM. GRANS. 0.1 (H) 0.00 - 0.04 K/UL    DF AUTOMATED     LACTIC ACID    Collection Time: 06/08/18 10:03 AM   Result Value Ref Range    Lactic acid 0.8 0.4 - 2.0 MMOL/L     Xr Foot Lt Min 3 V    Result Date: 6/8/2018  IMPRESSION: Fracture dislocation of the tarsometatarsal joints with destructive changes and periosteal reaction and marked soft tissue swelling. The appearance suggests a neuropathic (Charcot) foot perhaps related to an unrecognized Lisfranc fracture dislocation although infection cannot be entirely excluded. ED Course   11:16 AM - Plain film shows evidence of possible Lisfranc frx that is poorly healing. Discussed with Ortho who will see patient. Given evidence of surrounding infection, elevated WBC, tachycardia will admit to hospital service for continued IV antibiotics. Procedures    CONSULT NOTE:   11:02 AM  Rowena Marin MD spoke with Cristela Cruz MD   Specialty: Hospitalist  Discussed pt's hx, disposition, and available diagnostic and imaging results. Reviewed care plans. Consultant will evaluate pt for admission.   Written by DAKSHA Chávez, as dictated by Moiz Mckinley MD.    Disposition:     ADMIT NOTE:  11:03 AM  The patient is being admitted to the hospital by Jacque Parsons MD.  The results of their tests and reasons for their admission have been discussed with the patient and/or available family. They convey agreement and understanding for the need to be admitted and for their admission diagnosis.      Plan: Admit to hospitalist

## 2018-06-08 NOTE — H&P
Hospitalist Admission Note    NAME: Coni Salazar   :  1975   MRN:  [id number]     Date/Time:  2018 11:49 AM    Patient PCP: None  ______________________________________________________________________   Assessment & Plan:  Left lower leg and foot cellulitis, POA  Sepsis, POA  --WBC 14K, tachycardia. --given clindamycin and flagyl in ER. Continue abx with vancomycin and zosyn. --f/u blood cultures  --report left lower leg redness, swelling and pain x 1 month. Hospitalized for cellulitis at [facility] 8 days ago, unable to tolerate oral Bactrim after discharge. --check doppler US left leg to eval DVT  --oxycodone to 10mg q4h prn pain    Possible left Charcot foot or osteomyelitis, POA  --xray left foot shows fracture dislocation of the tarsometatarsal joints with separation between the first and second metatarsalsand dorsal and lateral displacement of the second through fifth metatarsals. There are degenerative destructive changes of the proximal metatarsals and tarsal bones with periosteal reaction and marked soft tissue swelling of the foot. There is no gas in the soft tissues. --denies any hx trauma to left foot, no hx fracture  --check esr, crp  --get MRI left foot  --orthopedic consulted by ER    Accelerated hypertension, poa  --noncompliant with meds to nausea, vomiting and also lack of insurance. Takes coreg 25mg daily and lasix  --put on metoprolol 50mg q8h.  --IV hydralazine prn    ALISON vs CKD from diabetic nephropathy or hypertensive nephrosclerosis  --Cr 1.3, was 0.62 in   --UA with >300 protein. Consider 24 hour urine protein. --has allergy to lisinopril with photosensitivity, dizziness and near syncope. Will try on diovan. --hold lasix, IVF NS 100ml/hr x 1 day. Recheck Cr in AM.     DM type 2, POA  --on metformin but noncompliant because it causes diarrhea. He would rather switch to different medication.   Will start glyburide 2.5mg bid.  --moderate dose SSI. Check A1c    Hepatitis C  --hep C antibody high + 2013. Been told borderline cirrhosis. Refer to Dr. Khloe Pitt outpatient  --no sign of cirrhosis -- abd US 2013 with fatty liver. No ascites on exam.  Platelet nl. Tobacco abuse  --nicotine patch. Advised quit smoking    Hx alcohol dependence  --used to drink daily, now just 3x/week. --monitor for withdrawal.  Thiamine, folic acid, MVI. Ativan prn per CIWA protocol    Case management consult as no pcp    Body mass index is 31.64 kg/(m^2). Code: full  DVT prophylaxis: lovenox  Surrogate decision maker:  mother        Subjective:   CHIEF COMPLAINT:  Left lower leg swelling, redness and pain x 1 month    HISTORY OF PRESENT ILLNESS:     Rosita Dent is a 44 y.o.  male with PMH DM type 2, HTN, hepatitis C, hx LLE cellulitis hospitalized ED [facility] 11/2013 who presents with 1 month of swelling, redness and pain to left lower leg.  +subjective fever and night sweats. Was seen at [facility] twice by his report. First time, sent home after leg doppler negative for DVT, told to keep leg elevated. Second time hospitalized for IV abx x 2 days (this was 8 days ago), had normal echo. Discharged home on Bactrim, lasix, metformin, coreg. Said he developed nausea and vomiting while in hospital from medications he was started on and continue to have n/v until he stopped taking meds. When vomiting stopped he went back on coreg which he has been on before. Denies any current abdominal pain, n/v.  Says metformin gives him diarrhea so he doesn't always take. Asks for something stronger for pain -- says pain still uncontrolled (given oxycodone 5mg and tramadol in ER). We were asked to admit for work up and evaluation of the above problems.      Past Medical History:   Diagnosis Date    Diabetes ([address]     Hepatitis C     Hypertension     Liver disease     Cirrohsis and hep C    Pancreatitis       Past Surgical History:   Procedure Laterality Date    HX APPENDECTOMY       Social History   Substance Use Topics    Smoking status: Current Every Day Smoker     Packs/day: 1.50    Smokeless tobacco: Not on file    Alcohol use 3.0 oz/week     6 Cans of beer per week    Drug use:  Denies. Lives with mother    Family History   Problem Relation Age of Onset    Diabetes Mother      legally blind    Heart Disease Mother      CHF    Kidney Disease Mother      on dialysis   [facility] Diabetes Father     Stroke Sister     Other Sister      arianne acuña    Brother -- hep C    Allergies   Allergen Reactions    Lisinopril Photosensitivity and Vertigo        Prior to Admission medications    Medication Sig Start Date End Date Taking? Authorizing Provider   carvedilol (COREG) 25 mg tablet Take 25 mg by mouth daily. Yes Historical Provider   diphenhydrAMINE (BENADRYL ALLERGY) 25 mg tablet Take 25 mg by mouth two (2) times a day. Yes Historical Provider   metFORMIN (GLUCOPHAGE) 850 mg tablet Take 850 mg by mouth two (2) times daily (with meals). Historical Provider   furosemide (LASIX) 20 mg tablet Take 20 mg by mouth daily. Historical Provider   trimethoprim-sulfamethoxazole (BACTRIM DS) 160-800 mg per tablet Take 1 Tab by mouth two (2) times a day. Historical Provider     REVIEW OF SYSTEMS:  POSITIVE= Bold.   Negative = normal text  General:  Subjective fever, chills, night sweats, generalized weakness, weight loss/gain, loss of appetite  Eyes:  blurred vision, eye pain, loss of vision, diplopia  Ear Nose and Throat:  rhinorrhea, pharyngitis  Respiratory:   cough, sputum production, SOB, wheezing, QUEVEDO, pleuritic pain  Cardiology:  chest pain, palpitations, orthopnea, PND, edema, syncope   Gastrointestinal:  abdominal pain, N/V, dysphagia, diarrhea, constipation, bleeding  Genitourinary:  frequency, urgency, dysuria, hematuria, incontinence  Muskuloskeletal :  arthralgia, myalgia  Hematology:  easy bruising, bleeding, lymphadenopathy  Dermatological:  rash, ulceration, pruritis  Endocrine:  hot flashes or polydipsia  Neurological:  headache, dizziness, confusion, focal weakness, paresthesia, memory loss, gait disturbance  Psychological: anxiety, depression, agitation      Objective:   VITALS:    Visit Vitals    BP (!) 207/102    Pulse 98    Temp 98.5 °F (36.9 °C)    Resp 14    Ht 5' 9\" (1.753 m)    Wt 97.2 kg (214 lb 4.6 oz)    SpO2 99%    BMI 31.64 kg/m2     Temp (24hrs), Av.5 °F (36.9 °C), Min:98.5 °F (36.9 °C), Max:98.5 °F (36.9 °C)    Body mass index is 31.64 kg/(m^2). PHYSICAL EXAM:    General:    Alert, cooperative, no distress, appears stated age. HEENT: Atraumatic, anicteric sclerae, pink conjunctivae. Cheeks red with acneiform papules     No oral ulcers, mucosa dry, throat clear. Hearing intact. Neck:  Supple, symmetrical,  thyroid: non tender  Lungs:   Clear to auscultation bilaterally. No Wheezing or Rhonchi. No rales. Chest wall:  No tenderness  No Accessory muscle use. Red papules in central chest  Heart:   Regular  rhythm,  Tachy, No  murmur   No gallop. Trace edema on right, 1-2+ edema on left. Abdomen:   Soft, non-tender. Not distended. Bowel sounds normal. No masses  Extremities: No cyanosis. No clubbing  Skin:     Not pale Not Jaundiced  Hands with dull redness. Left foot and left lower leg with diffuse soft tissue swelling, mild redness with increased warmth and larger in size compared to right. Left thigh larger than right but without redness        Psych:  Good insight. Depressed. Not anxious or agitated. Neurologic: EOMs intact. No facial asymmetry. No aphasia or slurred speech. Symmetrical strength, Alert and oriented X 3.    Peripheral pulse: Bilateral, DP, 2+  Capillary refill:  normal    IMAGING RESULTS:   []       I have personally reviewed the actual   []     CXR  []     CT scan  CXR:  Xray left foot:  Fracture dislocation of the tarsometatarsal joints with destructive  changes and periosteal reaction and marked soft tissue swelling. The appearance  suggests a neuropathic (Charcot) foot perhaps related to an unrecognized  Lisfranc fracture dislocation although infection cannot be entirely excluded. CT :  EKG:   ________________________________________________________________________  Care Plan discussed with:    Comments   Patient     Family      RN     Care Manager                    Consultant:      ________________________________________________________________________  Prophylaxis:  GI none   DVT lovenox   ________________________________________________________________________  Recommended Disposition:   Home with Family y   HH/PT/OT/RN y   SNF/LTC    FRANKO    ________________________________________________________________________  Code Status:  Full Code y   DNR/DNI    ________________________________________________________________________  TOTAL TIME:  54 minutes      Comments    y Reviewed previous records   ______________________________________________________________________  Lianna Bernard MD      Procedures: see electronic medical records for all procedures/Xrays and details which were not copied into this note but were reviewed prior to creation of Plan.     LAB DATA REVIEWED:    Recent Results (from the past 24 hour(s))   GLUCOSE, POC    Collection Time: 06/08/18  8:50 AM   Result Value Ref Range    Glucose (POC) 171 (H) 65 - 100 mg/dL    Performed by [employer]    METABOLIC PANEL, COMPREHENSIVE    Collection Time: 06/08/18 10:03 AM   Result Value Ref Range    Sodium 142 136 - 145 mmol/L    Potassium 3.9 3.5 - 5.1 mmol/L    Chloride 109 (H) 97 - 108 mmol/L    CO2 25 21 - 32 mmol/L    Anion gap 8 5 - 15 mmol/L    Glucose 180 (H) 65 - 100 mg/dL    BUN 18 6 - 20 MG/DL    Creatinine 1.30 0.70 - 1.30 MG/DL    BUN/Creatinine ratio 14 12 - 20      GFR est AA >60 >60 ml/min/1.73m2    GFR est non-AA >60 >60 ml/min/1.73m2    Calcium 8.3 (L) 8.5 - 10.1 MG/DL Bilirubin, total 0.3 0.2 - 1.0 MG/DL    ALT (SGPT) 15 12 - 78 U/L    AST (SGOT) 21 15 - 37 U/L    Alk. phosphatase 138 (H) 45 - 117 U/L    Protein, total 5.7 (L) 6.4 - 8.2 g/dL    Albumin 1.6 (L) 3.5 - 5.0 g/dL    Globulin 4.1 (H) 2.0 - 4.0 g/dL    A-G Ratio 0.4 (L) 1.1 - 2.2     CBC WITH AUTOMATED DIFF    Collection Time: 06/08/18 10:03 AM   Result Value Ref Range    WBC 14.1 (H) 4.1 - 11.1 K/uL    RBC 4.29 4. 10 - 5.70 M/uL    HGB 12.6 12.1 - 17.0 g/dL    HCT 38.9 36.6 - 50.3 %    MCV 90.7 80.0 - 99.0 FL    MCH 29.4 26.0 - 34.0 PG    MCHC 32.4 30.0 - 36.5 g/dL    RDW 15.5 (H) 11.5 - 14.5 %    PLATELET 257 859 - 226 K/uL    MPV 10.9 8.9 - 12.9 FL    NRBC 0.0 0  WBC    ABSOLUTE NRBC 0.00 0.00 - 0.01 K/uL    NEUTROPHILS 73 32 - 75 %    LYMPHOCYTES 13 12 - 49 %    MONOCYTES 10 5 - 13 %    EOSINOPHILS 3 0 - 7 %    BASOPHILS 1 0 - 1 %    IMMATURE GRANULOCYTES 0 0.0 - 0.5 %    ABS. NEUTROPHILS 10.3 (H) 1.8 - 8.0 K/UL    ABS. LYMPHOCYTES 1.8 0.8 - 3.5 K/UL    ABS. MONOCYTES 1.4 (H) 0.0 - 1.0 K/UL    ABS. EOSINOPHILS 0.4 0.0 - 0.4 K/UL    ABS. BASOPHILS 0.1 0.0 - 0.1 K/UL    ABS. IMM.  GRANS. 0.1 (H) 0.00 - 0.04 K/UL    DF AUTOMATED     LACTIC ACID    Collection Time: 06/08/18 10:03 AM   Result Value Ref Range    Lactic acid 0.8 0.4 - 2.0 MMOL/L

## 2018-06-08 NOTE — ED NOTES
Pt ambulatory to the ER c/o L foot pain and swelling x 1.5 weeks. Pt states he was recently seen at [place] and admitted for cellulitis. Pt states he has neuropathy. Palpable pulses to L foot.

## 2018-06-08 NOTE — ED NOTES
TRANSFER - OUT REPORT:    Verbal report given to RN(name) on Chante Knight  being transferred to [facility](unit) for routine progression of care       Report consisted of patients Situation, Background, Assessment and   Recommendations(SBAR). Information from the following report(s) SBAR, Kardex, ED Summary, Procedure Summary, Intake/Output and MAR was reviewed with the receiving nurse. Lines:   Peripheral IV 06/08/18 Right Antecubital (Active)   Site Assessment Clean, dry, & intact 6/8/2018 10:14 AM   Phlebitis Assessment 0 6/8/2018 10:14 AM   Dressing Status Clean, dry, & intact 6/8/2018 10:14 AM   Dressing Type 4 X 4;Transparent 6/8/2018 10:14 AM   Hub Color/Line Status Pink;Flushed 6/8/2018 10:14 AM   Action Taken Blood drawn 6/8/2018 10:14 AM        Opportunity for questions and clarification was provided. Patient transported with:   Tech     ---Patient in MRI and then will be transported to the floor.

## 2018-06-08 NOTE — PROGRESS NOTES
Pharmacy Automatic Renal Dosing Protocol - Antimicrobials    Indication for Antimicrobials: SSTI     Current Regimen of Each Antimicrobial:  Pip-ena 3.375 g IV q 6 hours (Start Date ; Day # 1)  Vancomycin IV (Start date: ; day #1)    Previous Antimicrobial Therapy:  Pt apparently received IV abx at [facility] a little over a week ago and was discharged on bactrim. The pharmacy student who clarified the patient's PTA medication regimen confirmed that patient did not take bactrim as an outpatient prior to today's admission. Goal Level: VANCOMYCIN TROUGH GOAL RANGE    Vancomycin Trough: 15 - 20 mcg/mL    Date Dose & Interval Measured (mcg/mL) Extrapolated (mcg/mL)                       Significant Cultures:   : blood cx: in process    Radiology / Imaging results: (X-ray, CT scan or MRI):    L foot XRay: lisfranc fx dislocation, infection cannot be ruled out    Paralysis, amputations, malnutrition: N/A    Labs:  Recent Labs      18   1003   CREA  1.30   BUN  18   WBC  14.1*     Temp (24hrs), Av °F (36.7 °C), Min:97.5 °F (36.4 °C), Max:98.5 °F (36.9 °C)    Creatinine Clearance (mL/min) or Dialysis: 74 mL/min    Impression/Plan:   · Scr elevated from baseline (~0.5 mcg/dL)  · Changed pip-tazo dosing to 3.375 g IV q 8 hours (via extended infusion) per protocol for renal function and indication  · Vancomycin regimen initiated as 2500 mg x 1 (~25 mg/kg), followed by a 1250 mg IV q 12 hours maintenance regimen for an expected trough level of ~18.5 mcg/mL   · A vanc trough should be ordered prior to the 4th dose  · Daily BMP ordered per protocol  · Antimicrobial stop date TBD     Pharmacy will follow daily and adjust medications as appropriate for renal function and/or serum levels.     Thank you,    Mandeep Gallardo, PharmD, [address] [facility] Pharmacy Specialist

## 2018-06-09 PROBLEM — N17.9 AKI (ACUTE KIDNEY INJURY) (HCC): Status: ACTIVE | Noted: 2018-06-09

## 2018-06-09 LAB
ANION GAP SERPL CALC-SCNC: 10 MMOL/L (ref 5–15)
BASOPHILS # BLD: 0.1 K/UL (ref 0–0.1)
BASOPHILS NFR BLD: 1 % (ref 0–1)
BUN SERPL-MCNC: 20 MG/DL (ref 6–20)
BUN/CREAT SERPL: 14 (ref 12–20)
CALCIUM SERPL-MCNC: 8.3 MG/DL (ref 8.5–10.1)
CHLORIDE SERPL-SCNC: 110 MMOL/L (ref 97–108)
CO2 SERPL-SCNC: 21 MMOL/L (ref 21–32)
CREAT SERPL-MCNC: 1.47 MG/DL (ref 0.7–1.3)
CRP SERPL-MCNC: 1.15 MG/DL (ref 0–0.6)
DIFFERENTIAL METHOD BLD: ABNORMAL
EOSINOPHIL # BLD: 0.6 K/UL (ref 0–0.4)
EOSINOPHIL NFR BLD: 5 % (ref 0–7)
ERYTHROCYTE [DISTWIDTH] IN BLOOD BY AUTOMATED COUNT: 15.6 % (ref 11.5–14.5)
ERYTHROCYTE [SEDIMENTATION RATE] IN BLOOD: 58 MM/HR (ref 0–15)
EST. AVERAGE GLUCOSE BLD GHB EST-MCNC: 148 MG/DL
GLUCOSE BLD STRIP.AUTO-MCNC: 128 MG/DL (ref 65–100)
GLUCOSE BLD STRIP.AUTO-MCNC: 75 MG/DL (ref 65–100)
GLUCOSE BLD STRIP.AUTO-MCNC: 81 MG/DL (ref 65–100)
GLUCOSE BLD STRIP.AUTO-MCNC: 87 MG/DL (ref 65–100)
GLUCOSE BLD STRIP.AUTO-MCNC: 87 MG/DL (ref 65–100)
GLUCOSE SERPL-MCNC: 86 MG/DL (ref 65–100)
HBA1C MFR BLD: 6.8 % (ref 4.2–6.3)
HCT VFR BLD AUTO: 38.3 % (ref 36.6–50.3)
HGB BLD-MCNC: 12.2 G/DL (ref 12.1–17)
IMM GRANULOCYTES # BLD: 0.1 K/UL (ref 0–0.04)
IMM GRANULOCYTES NFR BLD AUTO: 0 % (ref 0–0.5)
LYMPHOCYTES # BLD: 2.4 K/UL (ref 0.8–3.5)
LYMPHOCYTES NFR BLD: 21 % (ref 12–49)
MAGNESIUM SERPL-MCNC: 1.7 MG/DL (ref 1.6–2.4)
MCH RBC QN AUTO: 29.3 PG (ref 26–34)
MCHC RBC AUTO-ENTMCNC: 31.9 G/DL (ref 30–36.5)
MCV RBC AUTO: 92.1 FL (ref 80–99)
MONOCYTES # BLD: 1.3 K/UL (ref 0–1)
MONOCYTES NFR BLD: 12 % (ref 5–13)
NEUTS SEG # BLD: 6.9 K/UL (ref 1.8–8)
NEUTS SEG NFR BLD: 61 % (ref 32–75)
NRBC # BLD: 0 K/UL (ref 0–0.01)
NRBC BLD-RTO: 0 PER 100 WBC
PHOSPHATE SERPL-MCNC: 4 MG/DL (ref 2.6–4.7)
PLATELET # BLD AUTO: 278 K/UL (ref 150–400)
PMV BLD AUTO: 10.8 FL (ref 8.9–12.9)
POTASSIUM SERPL-SCNC: 3.8 MMOL/L (ref 3.5–5.1)
RBC # BLD AUTO: 4.16 M/UL (ref 4.1–5.7)
SERVICE CMNT-IMP: ABNORMAL
SERVICE CMNT-IMP: NORMAL
SODIUM SERPL-SCNC: 141 MMOL/L (ref 136–145)
WBC # BLD AUTO: 11.4 K/UL (ref 4.1–11.1)

## 2018-06-09 PROCEDURE — 74011000250 HC RX REV CODE- 250: Performed by: INTERNAL MEDICINE

## 2018-06-09 PROCEDURE — 85652 RBC SED RATE AUTOMATED: CPT | Performed by: HOSPITALIST

## 2018-06-09 PROCEDURE — 74011250636 HC RX REV CODE- 250/636: Performed by: INTERNAL MEDICINE

## 2018-06-09 PROCEDURE — 74011000258 HC RX REV CODE- 258: Performed by: HOSPITALIST

## 2018-06-09 PROCEDURE — 85025 COMPLETE CBC W/AUTO DIFF WBC: CPT | Performed by: HOSPITALIST

## 2018-06-09 PROCEDURE — 80048 BASIC METABOLIC PNL TOTAL CA: CPT | Performed by: HOSPITALIST

## 2018-06-09 PROCEDURE — 86140 C-REACTIVE PROTEIN: CPT | Performed by: HOSPITALIST

## 2018-06-09 PROCEDURE — 83036 HEMOGLOBIN GLYCOSYLATED A1C: CPT | Performed by: INTERNAL MEDICINE

## 2018-06-09 PROCEDURE — 65660000000 HC RM CCU STEPDOWN

## 2018-06-09 PROCEDURE — 82962 GLUCOSE BLOOD TEST: CPT

## 2018-06-09 PROCEDURE — 36415 COLL VENOUS BLD VENIPUNCTURE: CPT | Performed by: HOSPITALIST

## 2018-06-09 PROCEDURE — 83735 ASSAY OF MAGNESIUM: CPT | Performed by: HOSPITALIST

## 2018-06-09 PROCEDURE — 74011250636 HC RX REV CODE- 250/636: Performed by: HOSPITALIST

## 2018-06-09 PROCEDURE — 74011250637 HC RX REV CODE- 250/637: Performed by: INTERNAL MEDICINE

## 2018-06-09 PROCEDURE — 84100 ASSAY OF PHOSPHORUS: CPT | Performed by: HOSPITALIST

## 2018-06-09 PROCEDURE — 74011250637 HC RX REV CODE- 250/637: Performed by: HOSPITALIST

## 2018-06-09 RX ORDER — FUROSEMIDE 10 MG/ML
40 INJECTION INTRAMUSCULAR; INTRAVENOUS ONCE
Status: COMPLETED | OUTPATIENT
Start: 2018-06-09 | End: 2018-06-09

## 2018-06-09 RX ORDER — THERA TABS 400 MCG
1 TAB ORAL DAILY
Status: DISCONTINUED | OUTPATIENT
Start: 2018-06-09 | End: 2018-06-10 | Stop reason: HOSPADM

## 2018-06-09 RX ORDER — LANOLIN ALCOHOL/MO/W.PET/CERES
100 CREAM (GRAM) TOPICAL DAILY
Status: DISCONTINUED | OUTPATIENT
Start: 2018-06-09 | End: 2018-06-10 | Stop reason: HOSPADM

## 2018-06-09 RX ORDER — FOLIC ACID 1 MG/1
1 TABLET ORAL DAILY
Status: DISCONTINUED | OUTPATIENT
Start: 2018-06-09 | End: 2018-06-10 | Stop reason: HOSPADM

## 2018-06-09 RX ORDER — ONDANSETRON 2 MG/ML
8 INJECTION INTRAMUSCULAR; INTRAVENOUS
Status: DISCONTINUED | OUTPATIENT
Start: 2018-06-09 | End: 2018-06-10 | Stop reason: HOSPADM

## 2018-06-09 RX ORDER — LORAZEPAM 2 MG/ML
2 INJECTION INTRAMUSCULAR
Status: DISCONTINUED | OUTPATIENT
Start: 2018-06-09 | End: 2018-06-10 | Stop reason: HOSPADM

## 2018-06-09 RX ORDER — CARVEDILOL 12.5 MG/1
25 TABLET ORAL 2 TIMES DAILY WITH MEALS
Status: DISCONTINUED | OUTPATIENT
Start: 2018-06-09 | End: 2018-06-10 | Stop reason: HOSPADM

## 2018-06-09 RX ORDER — METOPROLOL TARTRATE 50 MG/1
100 TABLET ORAL EVERY 12 HOURS
Status: DISCONTINUED | OUTPATIENT
Start: 2018-06-09 | End: 2018-06-09

## 2018-06-09 RX ORDER — LORAZEPAM 2 MG/ML
1 INJECTION INTRAMUSCULAR
Status: DISCONTINUED | OUTPATIENT
Start: 2018-06-09 | End: 2018-06-10 | Stop reason: HOSPADM

## 2018-06-09 RX ADMIN — Medication 10 ML: at 05:26

## 2018-06-09 RX ADMIN — PIPERACILLIN SODIUM,TAZOBACTAM SODIUM 3.38 G: 3; .375 INJECTION, POWDER, FOR SOLUTION INTRAVENOUS at 23:52

## 2018-06-09 RX ADMIN — VANCOMYCIN HYDROCHLORIDE 1250 MG: 10 INJECTION, POWDER, LYOPHILIZED, FOR SOLUTION INTRAVENOUS at 05:18

## 2018-06-09 RX ADMIN — ONDANSETRON 4 MG: 2 INJECTION INTRAMUSCULAR; INTRAVENOUS at 12:03

## 2018-06-09 RX ADMIN — GLYBURIDE 2.5 MG: 5 TABLET ORAL at 08:23

## 2018-06-09 RX ADMIN — VALSARTAN 40 MG: 40 TABLET, FILM COATED ORAL at 01:58

## 2018-06-09 RX ADMIN — Medication 10 ML: at 14:00

## 2018-06-09 RX ADMIN — OXYCODONE HYDROCHLORIDE 10 MG: 5 TABLET ORAL at 10:19

## 2018-06-09 RX ADMIN — OXYCODONE HYDROCHLORIDE 10 MG: 5 TABLET ORAL at 14:23

## 2018-06-09 RX ADMIN — OXYCODONE HYDROCHLORIDE 10 MG: 5 TABLET ORAL at 18:17

## 2018-06-09 RX ADMIN — GLYBURIDE 2.5 MG: 5 TABLET ORAL at 17:12

## 2018-06-09 RX ADMIN — VANCOMYCIN HYDROCHLORIDE 1250 MG: 10 INJECTION, POWDER, LYOPHILIZED, FOR SOLUTION INTRAVENOUS at 16:41

## 2018-06-09 RX ADMIN — DOCUSATE SODIUM 100 MG: 100 CAPSULE, LIQUID FILLED ORAL at 08:24

## 2018-06-09 RX ADMIN — PIPERACILLIN SODIUM,TAZOBACTAM SODIUM 3.38 G: 3; .375 INJECTION, POWDER, FOR SOLUTION INTRAVENOUS at 16:41

## 2018-06-09 RX ADMIN — Medication 100 MG: at 10:19

## 2018-06-09 RX ADMIN — FUROSEMIDE 40 MG: 10 INJECTION, SOLUTION INTRAMUSCULAR; INTRAVENOUS at 14:18

## 2018-06-09 RX ADMIN — SODIUM CHLORIDE 5 MG: 9 INJECTION INTRAMUSCULAR; INTRAVENOUS; SUBCUTANEOUS at 21:13

## 2018-06-09 RX ADMIN — PIPERACILLIN SODIUM,TAZOBACTAM SODIUM 3.38 G: 3; .375 INJECTION, POWDER, FOR SOLUTION INTRAVENOUS at 08:23

## 2018-06-09 RX ADMIN — OXYCODONE HYDROCHLORIDE 10 MG: 5 TABLET ORAL at 06:17

## 2018-06-09 RX ADMIN — DOCUSATE SODIUM 100 MG: 100 CAPSULE, LIQUID FILLED ORAL at 17:12

## 2018-06-09 RX ADMIN — OXYCODONE HYDROCHLORIDE 10 MG: 5 TABLET ORAL at 02:05

## 2018-06-09 RX ADMIN — ENOXAPARIN SODIUM 40 MG: 100 INJECTION SUBCUTANEOUS at 12:00

## 2018-06-09 RX ADMIN — METOPROLOL TARTRATE 100 MG: 50 TABLET ORAL at 08:24

## 2018-06-09 RX ADMIN — CARVEDILOL 25 MG: 12.5 TABLET, FILM COATED ORAL at 16:41

## 2018-06-09 RX ADMIN — SODIUM CHLORIDE 5 MG: 9 INJECTION INTRAMUSCULAR; INTRAVENOUS; SUBCUTANEOUS at 15:03

## 2018-06-09 RX ADMIN — Medication 10 ML: at 21:16

## 2018-06-09 RX ADMIN — FOLIC ACID 1 MG: 1 TABLET ORAL at 08:24

## 2018-06-09 RX ADMIN — THERA TABS 1 TABLET: TAB at 08:24

## 2018-06-09 RX ADMIN — HYDRALAZINE HYDROCHLORIDE 20 MG: 20 INJECTION INTRAMUSCULAR; INTRAVENOUS at 11:29

## 2018-06-09 NOTE — CONSULTS
ORTHOPAEDIC CONSULT NOTE    Subjective:     Date of Consultation:  June 9, 2018      Negar Rice is a 44 y.o. male who is being seen for LLE cellulitis with Charcot foot deformity, min pain reported. Pt edema and erythema for the past 6-8 weeks, he was admitted with IV abx for 8 days and has come to ED at [facility] due to N/V with PO abx he was sent home on. Pt does not see a PCP or podiatrist and does not manage his DM at all. Patient Active Problem List    Diagnosis Date Noted    ALISON (acute kidney injury) ([facility] 06/09/2018    Foot fracture, left 06/08/2018    Left leg cellulitis 06/08/2018    Sepsis ([facility] 11/09/2013    Cellulitis and abscess of leg, except foot 11/09/2013    Type II or unspecified type diabetes mellitus without mention of complication, uncontrolled 11/09/2013    Accelerated hypertension 11/09/2013    Hyponatremia 11/09/2013    Abnormal LFTs 11/09/2013    Hepatitis C 11/09/2013    Alcohol abuse, daily use 11/09/2013     Family History   Problem Relation Age of Onset    Diabetes Mother      legally blind    Heart Disease Mother      CHF    Kidney Disease Mother      on dialysis    Diabetes Father     Stroke Sister     Other Sister      arianne acuña      Social History   Substance Use Topics    Smoking status: Current Every Day Smoker     Packs/day: 1.00    Smokeless tobacco: Never Used    Alcohol use 3.0 oz/week     6 Cans of beer per week      Comment: 80 oz beer 3x/week     Past Medical History:   Diagnosis Date    Cellulitis of left lower leg 11/2013    Diabetes ([facility]     type 2    Hepatitis C     Hypertension     Liver disease     Cirrohsis and hep C    Pancreatitis       Past Surgical History:   Procedure Laterality Date    HX APPENDECTOMY      HX ORTHOPAEDIC Right 2011    infected cyst removed from right leg in [address]      Prior to Admission medications    Medication Sig Start Date End Date Taking?  Authorizing Provider   carvedilol (COREG) 25 mg tablet Take 25 mg by mouth daily. Yes Historical Provider   diphenhydrAMINE (BENADRYL ALLERGY) 25 mg tablet Take 25 mg by mouth two (2) times a day. Yes Historical Provider   metFORMIN (GLUCOPHAGE) 850 mg tablet Take 850 mg by mouth two (2) times daily (with meals). Historical Provider   furosemide (LASIX) 20 mg tablet Take 20 mg by mouth daily. Historical Provider   trimethoprim-sulfamethoxazole (BACTRIM DS) 160-800 mg per tablet Take 1 Tab by mouth two (2) times a day.     Historical Provider     Current Facility-Administered Medications   Medication Dose Route Frequency    metoprolol tartrate (LOPRESSOR) tablet 100 mg  100 mg Oral Q12H    thiamine (B-1) tablet 100 mg  100 mg Oral DAILY    folic acid (FOLVITE) tablet 1 mg  1 mg Oral DAILY    therapeutic multivitamin (THERAGRAN) tablet 1 Tab  1 Tab Oral DAILY    LORazepam (ATIVAN) injection 1 mg  1 mg IntraVENous Q1H PRN    LORazepam (ATIVAN) injection 2 mg  2 mg IntraVENous Q1H PRN    sodium chloride (NS) flush 5-10 mL  5-10 mL IntraVENous Q8H    sodium chloride (NS) flush 5-10 mL  5-10 mL IntraVENous PRN    acetaminophen (TYLENOL) tablet 650 mg  650 mg Oral Q6H PRN    ondansetron (ZOFRAN) injection 4 mg  4 mg IntraVENous Q6H PRN    enoxaparin (LOVENOX) injection 40 mg  40 mg SubCUTAneous Q24H    0.9% sodium chloride infusion  100 mL/hr IntraVENous CONTINUOUS    glyBURIDE (DIABETA) tablet 2.5 mg  2.5 mg Oral BID    insulin lispro (HUMALOG) injection   SubCUTAneous AC&HS    glucose chewable tablet 16 g  4 Tab Oral PRN    dextrose (D50W) injection syrg 12.5-25 g  12.5-25 g IntraVENous PRN    glucagon (GLUCAGEN) injection 1 mg  1 mg IntraMUSCular PRN    oxyCODONE IR (ROXICODONE) tablet 10 mg  10 mg Oral Q4H PRN    docusate sodium (COLACE) capsule 100 mg  100 mg Oral BID    hydrALAZINE (APRESOLINE) 20 mg/mL injection 20 mg  20 mg IntraVENous Q6H PRN    nicotine (NICODERM CQ) 14 mg/24 hr patch 1 Patch  1 Patch TransDERmal DAILY    piperacillin-tazobactam (ZOSYN) 3.375 g in 0.9% sodium chloride (MBP/ADV) 100 mL  3.375 g IntraVENous Q8H    vancomycin (VANCOCIN) 1250 mg in  ml infusion  1,250 mg IntraVENous Q12H    valsartan (DIOVAN) tablet 40 mg  40 mg Oral DAILY      Allergies   Allergen Reactions    Lisinopril Photosensitivity and Vertigo        Review of Systems:  A comprehensive review of systems was negative except for that written in the HPI. Mental Status: no dementia    Objective:     Patient Vitals for the past 8 hrs:   BP Temp Pulse Resp SpO2   18 0726 (!) 200/102 97.6 °F (36.4 °C) 93 18 98 %   18 0432 170/83 97.6 °F (36.4 °C) 92 20 98 %     Temp (24hrs), Av.9 °F (36.6 °C), Min:97.5 °F (36.4 °C), Max:98.5 °F (36.9 °C)      Gen: Well-developed,  in no acute distress   Musc: LLE - significant edema with improved erythema per pt, limited ROM of ankle due to edema, charcot foot deformity small breaks in the skin without drainage, + pulse   Skin: No skin breakdown noted. Skin warm, pink, dry  Neuro: Cranial nerves are grossly intact, no focal motor weakness, follows commands appropriately   Psych: oriented to person, place and time, alert    Imaging Review: FINDINGS: There is fragmentation of the midfoot. There is dorsal subluxation and  dislocation of the tarsometatarsal joints. There is widening of the interspace  between the base of the first and second metatarsals. There is abnormal marrow  signal within the shaft and base of all metatarsals, cuneiforms, cuboid and  large portions of the navicular. These demonstrate postcontrast enhancement and  there is an irregular fluid collection extending through the residual  tarsometatarsal joint between the middle and medial cuneiforms into the  articulation between the cuneiforms and navicular. Its exact measurements are  somewhat difficult given its irregular orientation. It measures approximately  6.1 x 1.4 x 4.9 cm.  There is diffuse enhancement of the remaining soft tissues  of the foot.     There is enhancing synovitis of the peroneal tendons with an apparent loose body  within the tendon sheath. There is no ankle effusion.     IMPRESSION  IMPRESSION:     1. Findings compatible with Charcot changes of the midfoot. Cannot exclude  superimposed infection       Labs:   Recent Results (from the past 24 hour(s))   GLUCOSE, POC    Collection Time: 06/08/18  8:50 AM   Result Value Ref Range    Glucose (POC) 171 (H) 65 - 100 mg/dL    Performed by Sophia Cooley    METABOLIC PANEL, COMPREHENSIVE    Collection Time: 06/08/18 10:03 AM   Result Value Ref Range    Sodium 142 136 - 145 mmol/L    Potassium 3.9 3.5 - 5.1 mmol/L    Chloride 109 (H) 97 - 108 mmol/L    CO2 25 21 - 32 mmol/L    Anion gap 8 5 - 15 mmol/L    Glucose 180 (H) 65 - 100 mg/dL    BUN 18 6 - 20 MG/DL    Creatinine 1.30 0.70 - 1.30 MG/DL    BUN/Creatinine ratio 14 12 - 20      GFR est AA >60 >60 ml/min/1.73m2    GFR est non-AA >60 >60 ml/min/1.73m2    Calcium 8.3 (L) 8.5 - 10.1 MG/DL    Bilirubin, total 0.3 0.2 - 1.0 MG/DL    ALT (SGPT) 15 12 - 78 U/L    AST (SGOT) 21 15 - 37 U/L    Alk. phosphatase 138 (H) 45 - 117 U/L    Protein, total 5.7 (L) 6.4 - 8.2 g/dL    Albumin 1.6 (L) 3.5 - 5.0 g/dL    Globulin 4.1 (H) 2.0 - 4.0 g/dL    A-G Ratio 0.4 (L) 1.1 - 2.2     CBC WITH AUTOMATED DIFF    Collection Time: 06/08/18 10:03 AM   Result Value Ref Range    WBC 14.1 (H) 4.1 - 11.1 K/uL    RBC 4.29 4. 10 - 5.70 M/uL    HGB 12.6 12.1 - 17.0 g/dL    HCT 38.9 36.6 - 50.3 %    MCV 90.7 80.0 - 99.0 FL    MCH 29.4 26.0 - 34.0 PG    MCHC 32.4 30.0 - 36.5 g/dL    RDW 15.5 (H) 11.5 - 14.5 %    PLATELET 829 084 - 135 K/uL    MPV 10.9 8.9 - 12.9 FL    NRBC 0.0 0  WBC    ABSOLUTE NRBC 0.00 0.00 - 0.01 K/uL    NEUTROPHILS 73 32 - 75 %    LYMPHOCYTES 13 12 - 49 %    MONOCYTES 10 5 - 13 %    EOSINOPHILS 3 0 - 7 %    BASOPHILS 1 0 - 1 %    IMMATURE GRANULOCYTES 0 0.0 - 0.5 %    ABS. NEUTROPHILS 10.3 (H) 1.8 - 8.0 K/UL    ABS. LYMPHOCYTES 1.8 0.8 - 3.5 K/UL    ABS. MONOCYTES 1.4 (H) 0.0 - 1.0 K/UL    ABS. EOSINOPHILS 0.4 0.0 - 0.4 K/UL    ABS. BASOPHILS 0.1 0.0 - 0.1 K/UL    ABS. IMM.  GRANS. 0.1 (H) 0.00 - 0.04 K/UL    DF AUTOMATED     CULTURE, BLOOD, PAIRED    Collection Time: 06/08/18 10:03 AM   Result Value Ref Range    Special Requests: NO SPECIAL REQUESTS      Culture result: NO GROWTH AFTER 21 HOURS     LACTIC ACID    Collection Time: 06/08/18 10:03 AM   Result Value Ref Range    Lactic acid 0.8 0.4 - 2.0 MMOL/L   GLUCOSE, POC    Collection Time: 06/08/18  3:55 PM   Result Value Ref Range    Glucose (POC) 136 (H) 65 - 100 mg/dL    Performed by GLENN PAT(CON)    URINALYSIS W/ REFLEX CULTURE    Collection Time: 06/08/18  5:40 PM   Result Value Ref Range    Color YELLOW/STRAW      Appearance CLEAR CLEAR      Specific gravity 1.021 1.003 - 1.030      pH (UA) 6.5 5.0 - 8.0      Protein >300 (A) NEG mg/dL    Glucose 250 (A) NEG mg/dL    Ketone NEGATIVE  NEG mg/dL    Bilirubin NEGATIVE  NEG      Blood SMALL (A) NEG      Urobilinogen 0.2 0.2 - 1.0 EU/dL    Nitrites NEGATIVE  NEG      Leukocyte Esterase NEGATIVE  NEG      WBC 0-4 0 - 4 /hpf    RBC 5-10 0 - 5 /hpf    Epithelial cells FEW FEW /lpf    Bacteria NEGATIVE  NEG /hpf    UA:UC IF INDICATED CULTURE NOT INDICATED BY UA RESULT CNI     DRUG SCREEN, URINE    Collection Time: 06/08/18  5:40 PM   Result Value Ref Range    AMPHETAMINES NEGATIVE  NEG      BARBITURATES NEGATIVE  NEG      BENZODIAZEPINES NEGATIVE  NEG      COCAINE NEGATIVE  NEG      METHADONE NEGATIVE  NEG      OPIATES NEGATIVE  NEG      PCP(PHENCYCLIDINE) NEGATIVE  NEG      THC (TH-CANNABINOL) NEGATIVE  NEG      Drug screen comment (NOTE)    GLUCOSE, POC    Collection Time: 06/08/18  8:30 PM   Result Value Ref Range    Glucose (POC) 162 (H) 65 - 100 mg/dL    Performed by GLENN PAT(CON)    CBC WITH AUTOMATED DIFF    Collection Time: 06/09/18  2:00 AM   Result Value Ref Range    WBC 11.4 (H) 4.1 - 11.1 K/uL    RBC 4.16 4.10 - 5.70 M/uL    HGB 12.2 12.1 - 17.0 g/dL    HCT 38.3 36.6 - 50.3 %    MCV 92.1 80.0 - 99.0 FL    MCH 29.3 26.0 - 34.0 PG    MCHC 31.9 30.0 - 36.5 g/dL    RDW 15.6 (H) 11.5 - 14.5 %    PLATELET 918 891 - 581 K/uL    MPV 10.8 8.9 - 12.9 FL    NRBC 0.0 0  WBC    ABSOLUTE NRBC 0.00 0.00 - 0.01 K/uL    NEUTROPHILS 61 32 - 75 %    LYMPHOCYTES 21 12 - 49 %    MONOCYTES 12 5 - 13 %    EOSINOPHILS 5 0 - 7 %    BASOPHILS 1 0 - 1 %    IMMATURE GRANULOCYTES 0 0.0 - 0.5 %    ABS. NEUTROPHILS 6.9 1.8 - 8.0 K/UL    ABS. LYMPHOCYTES 2.4 0.8 - 3.5 K/UL    ABS. MONOCYTES 1.3 (H) 0.0 - 1.0 K/UL    ABS. EOSINOPHILS 0.6 (H) 0.0 - 0.4 K/UL    ABS. BASOPHILS 0.1 0.0 - 0.1 K/UL    ABS. IMM.  GRANS. 0.1 (H) 0.00 - 0.04 K/UL    DF AUTOMATED     MAGNESIUM    Collection Time: 06/09/18  2:00 AM   Result Value Ref Range    Magnesium 1.7 1.6 - 2.4 mg/dL   PHOSPHORUS    Collection Time: 06/09/18  2:00 AM   Result Value Ref Range    Phosphorus 4.0 2.6 - 4.7 MG/DL   METABOLIC PANEL, BASIC    Collection Time: 06/09/18  2:00 AM   Result Value Ref Range    Sodium 141 136 - 145 mmol/L    Potassium 3.8 3.5 - 5.1 mmol/L    Chloride 110 (H) 97 - 108 mmol/L    CO2 21 21 - 32 mmol/L    Anion gap 10 5 - 15 mmol/L    Glucose 86 65 - 100 mg/dL    BUN 20 6 - 20 MG/DL    Creatinine 1.47 (H) 0.70 - 1.30 MG/DL    BUN/Creatinine ratio 14 12 - 20      GFR est AA >60 >60 ml/min/1.73m2    GFR est non-AA 53 (L) >60 ml/min/1.73m2    Calcium 8.3 (L) 8.5 - 10.1 MG/DL   SED RATE (ESR)    Collection Time: 06/09/18  2:00 AM   Result Value Ref Range    Sed rate, automated 58 (H) 0 - 15 mm/hr   C REACTIVE PROTEIN, QT    Collection Time: 06/09/18  2:00 AM   Result Value Ref Range    C-Reactive protein 1.15 (H) 0.00 - 0.60 mg/dL   GLUCOSE, POC    Collection Time: 06/09/18  7:24 AM   Result Value Ref Range    Glucose (POC) 75 65 - 100 mg/dL    Performed by [facility] MAL[facility](C0N)    GLUCOSE, POC    Collection Time: 06/09/18  8:22 AM   Result Value Ref Range    Glucose (POC) 87 65 - 100 mg/dL    Performed by Maya Palacios          Impression:     Patient Active Problem List    Diagnosis Date Noted    [facility] 06/09/2018    Foot fracture, left 06/08/2018    Left leg cellulitis 06/08/2018    Sepsis ([facility] 11/09/2013    Cellulitis and abscess of leg, except foot 11/09/2013    Type II or unspecified type diabetes mellitus without mention of complication, uncontrolled 11/09/2013    Accelerated hypertension 11/09/2013    Hyponatremia 11/09/2013    Abnormal LFTs 11/09/2013    Hepatitis C 11/09/2013    Alcohol abuse, daily use 11/09/2013     Principal Problem:    Left leg cellulitis (6/8/2018)    Active Problems:    Sepsis ([facility] [address] (11/9/2013)      Accelerated hypertension (11/9/2013)      Foot fracture, left (6/8/2018)      ALISON (acute kidney injury) ([facility] (6/9/2018)        Plan:   -  Continue management of Cellulitis as per medicine  - would defer long term management of Charcot foot to podiatry   - stressed the importance of getting a PCP for managment of DM and podiatry regularly to continue care of his feet given diagnosis of Charcot foot and infections, explained without proper care and follow up the pt could lose his foot if the infection/ lack of self care related to the diabetes continues       Dr. Hansen aware and agrees with plan as above.         Chelle Martin, NP  Orthopedic Nurse Practitioner   [place]

## 2018-06-09 NOTE — PROGRESS NOTES
0700: Received bedside report from Binh Leal off going nurse. Assumed care of patient. 0800: MD aware of /102, ordered to stop continuous fluids. Scheduled metoroplol given. Will continue to monitor. 0900: /96    1130: /96, PRN hydralazine given. Will continue to monitor. 1400: BP still elevated at 176/92. Dr. Mata Garcia aware. Ordered IV lasix and coreg. 1500: Podiatry consult placed for charcot foot. Dr. Pappas Sensor will see patient tonight or early tomorrow morning. 1900: Bedside shift change report given to , RN (oncoming nurse). Report included the following information SBAR, Kardex, Intake/Output, MAR, Recent Results and Cardiac Rhythm NSR.     SHIFT SUMMARY:            [address] NURSING NOTE   Admission Date 6/8/2018   Admission Diagnosis Accelerated hypertension  Left leg cellulitis  Foot fracture, left   Consults IP CONSULT TO ORTHOPEDIC SURGERY  IP CONSULT TO PODIATRY      Cardiac Monitoring [x] Yes [] No      Purposeful Hourly Rounding [x] Yes    Raymond Score Total Score: 1   Raymond score 3 or > [] Bed Alarm [] Avasys [] 1:1 sitter [] Patient refused (Signed refusal form in chart)   Isidoro Score Isidoro Score: 22   Isidoro score 14 or < [] PMT consult [] Wound Care consult    []  Specialty bed  [] Nutrition consult      Influenza Vaccine Received Flu Vaccine for Current Season (usually Sept-March): Not Flu Season           Oxygen needs? [x] Room air Oxygen @  []1L    []2L    []3L   []4L    []5L   []6L via  NC   Chronic home O2 use?  [] Yes [] No  Perform O2 challenge test and document in progress note using smartphrase (.Homeoxygen)      Last bowel movement Last Bowel Movement Date: 06/07/18      Urinary Catheter             LDAs               Peripheral IV 06/08/18 Right Antecubital (Active)   Site Assessment Clean, dry, & intact 6/9/2018  2:54 PM   Phlebitis Assessment 0 6/9/2018  2:54 PM   Infiltration Assessment 0 6/9/2018  2:54 PM   Dressing Status Clean, dry, & intact 6/9/2018 2:54 PM   Dressing Type Transparent 6/9/2018  2:54 PM   Hub Color/Line Status Flushed;Patent;Pink 6/9/2018  2:54 PM   Action Taken Blood drawn 6/8/2018 10:14 AM                         Readmission Risk Assessment Tool Score Low Risk            6       Total Score        4 Pt. Coverage (Medicare=5 , Medicaid, or Self-Pay=4)    2 Charlson Comorbidity Score (Age + Comorbid Conditions)        Criteria that do not apply:    Has Seen PCP in Last 6 Months (Yes=3, No=0)    . Living with Significant Other. Assisted Living. LTAC. SNF.  or   Rehab    Patient Length of Stay (>5 days = 3)    IP Visits Last 12 Months (1-3=4, 4=9, >4=11)       Expected Length of Stay - - -   Actual Length of Stay 1

## 2018-06-09 NOTE — PROGRESS NOTES
0700: Received bedside report from Binh Leal, off going nurse. Assumed care of patient. 0730: Blood sugar 75, patient given apple juice, eating breakfast. Will recheck.

## 2018-06-09 NOTE — PROGRESS NOTES
ORTHO    Noncompliant patient with charcot foot. No indication for emergent surgery. Defer to podiatry.

## 2018-06-09 NOTE — PROGRESS NOTES
Foot and Ankle  Progress Note    Admit Date: 6/8/2018  Hospital day 2    Subjective:     Patient has no complaint of swollen left foot of unknown  Duration.   Complains of minimal pain and does not recalll any type of trauma    Current Facility-Administered Medications   Medication Dose Route Frequency    thiamine (B-1) tablet 100 mg  100 mg Oral DAILY    folic acid (FOLVITE) tablet 1 mg  1 mg Oral DAILY    therapeutic multivitamin (THERAGRAN) tablet 1 Tab  1 Tab Oral DAILY    LORazepam (ATIVAN) injection 1 mg  1 mg IntraVENous Q1H PRN    LORazepam (ATIVAN) injection 2 mg  2 mg IntraVENous Q1H PRN    [START ON 6/10/2018] Vancomycin Trough- Please draw prior to 0500 dose on 6/10/18  1 Each Other ONCE    carvedilol (COREG) tablet 25 mg  25 mg Oral BID WITH MEALS    prochlorperazine (COMPAZINE) with saline injection 5 mg  5 mg IntraVENous Q6H PRN    ondansetron (ZOFRAN) injection 8 mg  8 mg IntraVENous Q8H PRN    sodium chloride (NS) flush 5-10 mL  5-10 mL IntraVENous Q8H    sodium chloride (NS) flush 5-10 mL  5-10 mL IntraVENous PRN    acetaminophen (TYLENOL) tablet 650 mg  650 mg Oral Q6H PRN    enoxaparin (LOVENOX) injection 40 mg  40 mg SubCUTAneous Q24H    glyBURIDE (DIABETA) tablet 2.5 mg  2.5 mg Oral BID    insulin lispro (HUMALOG) injection   SubCUTAneous AC&HS    glucose chewable tablet 16 g  4 Tab Oral PRN    dextrose (D50W) injection syrg 12.5-25 g  12.5-25 g IntraVENous PRN    glucagon (GLUCAGEN) injection 1 mg  1 mg IntraMUSCular PRN    oxyCODONE IR (ROXICODONE) tablet 10 mg  10 mg Oral Q4H PRN    docusate sodium (COLACE) capsule 100 mg  100 mg Oral BID    hydrALAZINE (APRESOLINE) 20 mg/mL injection 20 mg  20 mg IntraVENous Q6H PRN    nicotine (NICODERM CQ) 14 mg/24 hr patch 1 Patch  1 Patch TransDERmal DAILY    piperacillin-tazobactam (ZOSYN) 3.375 g in 0.9% sodium chloride (MBP/ADV) 100 mL  3.375 g IntraVENous Q8H    vancomycin (VANCOCIN) 1250 mg in  ml infusion  1,250 mg IntraVENous Q12H    valsartan (DIOVAN) tablet 40 mg  40 mg Oral DAILY        H&P  No changes from admitting H&P    Objective:     Patient Vitals for the past 8 hrs:   BP Temp Pulse Resp SpO2   06/09/18 1619 139/72 97.4 °F (36.3 °C) 95 18 100 %   06/09/18 1300 (!) 176/92 - - - -   06/09/18 1102 (!) 181/96 97.3 °F (36.3 °C) 83 18 99 %     06/09 0701 - 06/09 1900  In: -   Out: 550 [Urine:550]  06/07 1901 - 06/09 0700  In: 360 [P.O.:360]  Out: 600 [Urine:600]    Physical Exam: lower extremities  Palpable pedal pulses  Left lower leg and foot is swollen; no increased warmth nor erythema  Left foot is wide and disfigured with characteristics of charcot  Rocker bottom left foot with no open integument; shallow callous plantar left CCJ    XRAY LEFT FOOT:  Fragmentation of bone and degenerative changes at the tarsal joints charcteristic of charcot. Rocker bottom foot with hyperostosis plantar  CCJ    MRI:shows degenerative changes that are probably secondary to charcot . No evidence of abscess nor sinus tracking    Venous Duplex Scan shows no evidence of DVT left lower leg and foot          Data Review   Recent Results (from the past 24 hour(s))   GLUCOSE, POC    Collection Time: 06/08/18  8:30 PM   Result Value Ref Range    Glucose (POC) 162 (H) 65 - 100 mg/dL    Performed by Sebastian PAT(FADY)    CBC WITH AUTOMATED DIFF    Collection Time: 06/09/18  2:00 AM   Result Value Ref Range    WBC 11.4 (H) 4.1 - 11.1 K/uL    RBC 4.16 4.10 - 5.70 M/uL    HGB 12.2 12.1 - 17.0 g/dL    HCT 38.3 36.6 - 50.3 %    MCV 92.1 80.0 - 99.0 FL    MCH 29.3 26.0 - 34.0 PG    MCHC 31.9 30.0 - 36.5 g/dL    RDW 15.6 (H) 11.5 - 14.5 %    PLATELET 452 707 - 862 K/uL    MPV 10.8 8.9 - 12.9 FL    NRBC 0.0 0  WBC    ABSOLUTE NRBC 0.00 0.00 - 0.01 K/uL    NEUTROPHILS 61 32 - 75 %    LYMPHOCYTES 21 12 - 49 %    MONOCYTES 12 5 - 13 %    EOSINOPHILS 5 0 - 7 %    BASOPHILS 1 0 - 1 %    IMMATURE GRANULOCYTES 0 0.0 - 0.5 %    ABS.  NEUTROPHILS 6.9 1.8 - 8.0 K/UL    ABS. LYMPHOCYTES 2.4 0.8 - 3.5 K/UL    ABS. MONOCYTES 1.3 (H) 0.0 - 1.0 K/UL    ABS. EOSINOPHILS 0.6 (H) 0.0 - 0.4 K/UL    ABS. BASOPHILS 0.1 0.0 - 0.1 K/UL    ABS. IMM.  GRANS. 0.1 (H) 0.00 - 0.04 K/UL    DF AUTOMATED     MAGNESIUM    Collection Time: 06/09/18  2:00 AM   Result Value Ref Range    Magnesium 1.7 1.6 - 2.4 mg/dL   PHOSPHORUS    Collection Time: 06/09/18  2:00 AM   Result Value Ref Range    Phosphorus 4.0 2.6 - 4.7 MG/DL   METABOLIC PANEL, BASIC    Collection Time: 06/09/18  2:00 AM   Result Value Ref Range    Sodium 141 136 - 145 mmol/L    Potassium 3.8 3.5 - 5.1 mmol/L    Chloride 110 (H) 97 - 108 mmol/L    CO2 21 21 - 32 mmol/L    Anion gap 10 5 - 15 mmol/L    Glucose 86 65 - 100 mg/dL    BUN 20 6 - 20 MG/DL    Creatinine 1.47 (H) 0.70 - 1.30 MG/DL    BUN/Creatinine ratio 14 12 - 20      GFR est AA >60 >60 ml/min/1.73m2    GFR est non-AA 53 (L) >60 ml/min/1.73m2    Calcium 8.3 (L) 8.5 - 10.1 MG/DL   SED RATE (ESR)    Collection Time: 06/09/18  2:00 AM   Result Value Ref Range    Sed rate, automated 58 (H) 0 - 15 mm/hr   C REACTIVE PROTEIN, QT    Collection Time: 06/09/18  2:00 AM   Result Value Ref Range    C-Reactive protein 1.15 (H) 0.00 - 0.60 mg/dL   HEMOGLOBIN A1C WITH EAG    Collection Time: 06/09/18  2:00 AM   Result Value Ref Range    Hemoglobin A1c 6.8 (H) 4.2 - 6.3 %    Est. average glucose 148 mg/dL   GLUCOSE, POC    Collection Time: 06/09/18  7:24 AM   Result Value Ref Range    Glucose (POC) 75 65 - 100 mg/dL    Performed by [facility] MAL[facility](C0N)    GLUCOSE, POC    Collection Time: 06/09/18  8:22 AM   Result Value Ref Range    Glucose (POC) 87 65 - 100 mg/dL    Performed by [address] [place] [address], POC    Collection Time: 06/09/18 11:07 AM   Result Value Ref Range    Glucose (POC) 87 65 - 100 mg/dL    Performed by [facility](C0N)    GLUCOSE, POC    Collection Time: 06/09/18  4:41 PM   Result Value Ref Range    Glucose (POC) 81 65 - 100 mg/dL    Performed by Kellee Cannon Assessment:     Principal Problem:    Left leg cellulitis (6/8/2018)    Active Problems:    Sepsis ([facility] (11/9/2013)      Accelerated hypertension (11/9/2013)      Foot fracture, left (6/8/2018)      ALISON (acute kidney injury) ([facility] (6/9/2018)    Charcot left foot    Plan: This patient probably does not need surgery at this time; there is no open integument. I have ordered ID consult secondary to elevated WBC's. From a podiatry standpoint this patient can be discharged once his WBC's are normalized.   He needs to follow up in my office [facility]

## 2018-06-09 NOTE — PROGRESS NOTES
Bedside shift change report given to MICHAEL Raymundo  by Sofi Low . Report included the following information SBAR and Kardex.

## 2018-06-09 NOTE — PROGRESS NOTES
Hospitalist Progress Note    NAME: Rocky Riggs   :  1975   MRN:  [id number]       Assessment / Plan:  Left lower leg and foot cellulitis, POA  Charcot foot  Osteomyelitis ruled out  Sepsis, POA; improving, Leukocytosis down-trending  -dopplers negative of DVT  -MRI Left Foot: \"IMPRESSION: 1. Findings compatible with Charcot changes of the midfoot. Cannot exclude superimposed infection\"  -Continue vancomycin and zosyn  -f/u blood cultures  -continue oxycodone to 10mg q4h prn pain  -case discussed with Orthopedic surgery this am  -consult Podiatry    Accelerated hypertension, poa  -metoprolol changed back to Coreg 25 mg BID  -stop IVF's  -give IV lasix  -prn IV hydralazine     CKD stage III: doubt ALISON, suspect diabetic nephropathy  -continue diovan and stop IVF's  -IV lasix WILFREDO as above to try ans get BP down     DM type 2, POA: 6.8  -glyburide 2.5mg bid being used in place of metformin    Hepatitis C  --hep C antibody high + . Been told borderline cirrhosis. Refer to Dr. Dasia Baltazar outpatient  --no sign of cirrhosis -- abd US  with fatty liver. No ascites on exam.  Platelet nl.     Tobacco abuse  --nicotine patch. Advised to quit smoking by one of my partners.     Hx alcohol dependence  --used to drink daily, now just 3x/week. --monitor for withdrawal.  Thiamine, folic acid, MVI. Ativan prn per CIWA protocol     Obesity: Body mass index is 31.64 kg/(m^2).     Case management consult as no pcp    Code: full  DVT prophylaxis: lovenox  Surrogate decision maker:  mother       Subjective:     Chief Complaint / Reason for Physician Visit: follow-up cellulitis  Patient's LLE with improvement in erythema but still with pain    Review of Systems:  Symptom Y/N Comments  Symptom Y/N Comments   Fever/Chills n   Chest Pain n    Poor Appetite    Edema     Cough    Abdominal Pain n    Sputum    Joint Pain     SOB/QUEVEDO n   Pruritis/Rash     Nausea/vomit    Tolerating PT/OT     Diarrhea    Tolerating Diet y Constipation    Other       Could NOT obtain due to:      Objective:     VITALS:   Last 24hrs VS reviewed since prior progress note. Most recent are:  Patient Vitals for the past 24 hrs:   Temp Pulse Resp BP SpO2   06/09/18 0854 - - - (!) 170/96 -   06/09/18 0726 97.6 °F (36.4 °C) 93 18 (!) 200/102 98 %   06/09/18 0432 97.6 °F (36.4 °C) 92 20 170/83 98 %   06/08/18 2316 97.9 °F (36.6 °C) 87 20 147/78 99 %   06/08/18 1831 98 °F (36.7 °C) 84 18 (!) 182/94 98 %   06/08/18 1506 97.5 °F (36.4 °C) 91 18 (!) 172/101 99 %   06/08/18 1230 - 97 16 (!) 178/95 99 %   06/08/18 1200 - - - 185/89 98 %   06/08/18 1130 - - - (!) 203/104 98 %   06/08/18 1100 - - - (!) 207/102 99 %   06/08/18 1030 - - - (!) 206/112 100 %   06/08/18 1012 - 98 - (!) 219/116 -       Intake/Output Summary (Last 24 hours) at 06/09/18 1011  Last data filed at 06/08/18 1831   Gross per 24 hour   Intake              360 ml   Output              600 ml   Net             -240 ml        PHYSICAL EXAM:  General: WD, WN. Alert, cooperative, no acute distress    EENT:  EOMI. Anicteric sclerae. MMM  Resp:  CTA bilaterally, no wheezing or rales. No accessory muscle use  CV:  Regular  rhythm,  + LLE edema  GI:  Soft, Non distended, Non tender.  +Bowel sounds  Neurologic:  Alert and oriented X 3, normal speech,   Psych:   Good insight. Not anxious nor agitated  Skin:  Erythema of LLE.   No jaundice    Reviewed most current lab test results and cultures  YES  Reviewed most current radiology test results   YES  Review and summation of old records today    NO  Reviewed patient's current orders and MAR    YES  PMH/SH reviewed - no change compared to H&P  ________________________________________________________________________  Care Plan discussed with:    Comments   Patient x    Family  x Sister   RN x    Care Manager     Consultant  x Ortho                    x Multidiciplinary team rounds were held today with nursing ________________________________________________________________________  Total NON critical care TIME:  35   Minutes    Total CRITICAL CARE TIME Spent:   Minutes non procedure based      Comments   >50% of visit spent in counseling and coordination of care x    ________________________________________________________________________  Joshua Madsen MD     Procedures: see electronic medical records for all procedures/Xrays and details which were not copied into this note but were reviewed prior to creation of Plan. LABS:  I reviewed today's most current labs and imaging studies.   Pertinent labs include:  Recent Labs      06/09/18   0200  06/08/18   1003   WBC  11.4*  14.1*   HGB  12.2  12.6   HCT  38.3  38.9   PLT  278  285     Recent Labs      06/09/18   0200  06/08/18   1003   NA  141  142   K  3.8  3.9   CL  110*  109*   CO2  21  25   GLU  86  180*   BUN  20  18   CREA  1.47*  1.30   CA  8.3*  8.3*   MG  1.7   --    PHOS  4.0   --    ALB   --   1.6*   TBILI   --   0.3   SGOT   --   21   ALT   --   15       Signed: Joshua Madsen MD

## 2018-06-09 NOTE — PROGRESS NOTES
06/09/18 0726   Vitals   Temp 97.6 °F (36.4 °C)   Temp Source Oral   Pulse (Heart Rate) 93   Heart Rate Source Monitor   Resp Rate 18   O2 Sat (%) 98 %   Level of Consciousness Alert   BP (!) 200/102   MAP (Calculated) 135   BP 1 Location Left arm   BP 1 Method Automatic   BP Patient Position At rest   MEWS Score 3   Pain 1   Pain Scale 1 Numeric (0 - 10)   Pain Intensity 1 5   Patient Stated Pain Goal 3   MEWS of 3, /102. Scheduled metoprolol given, will recheck and follow up.  MD aware

## 2018-06-10 VITALS
HEIGHT: 69 IN | HEART RATE: 84 BPM | DIASTOLIC BLOOD PRESSURE: 65 MMHG | OXYGEN SATURATION: 97 % | SYSTOLIC BLOOD PRESSURE: 123 MMHG | WEIGHT: 214.29 LBS | TEMPERATURE: 97.4 F | RESPIRATION RATE: 18 BRPM | BODY MASS INDEX: 31.74 KG/M2

## 2018-06-10 LAB
ANION GAP SERPL CALC-SCNC: 7 MMOL/L (ref 5–15)
BUN SERPL-MCNC: 20 MG/DL (ref 6–20)
BUN/CREAT SERPL: 13 (ref 12–20)
CALCIUM SERPL-MCNC: 8.4 MG/DL (ref 8.5–10.1)
CHLORIDE SERPL-SCNC: 110 MMOL/L (ref 97–108)
CO2 SERPL-SCNC: 25 MMOL/L (ref 21–32)
CREAT SERPL-MCNC: 1.6 MG/DL (ref 0.7–1.3)
DATE LAST DOSE: ABNORMAL
GLUCOSE BLD STRIP.AUTO-MCNC: 144 MG/DL (ref 65–100)
GLUCOSE BLD STRIP.AUTO-MCNC: 84 MG/DL (ref 65–100)
GLUCOSE SERPL-MCNC: 105 MG/DL (ref 65–100)
POTASSIUM SERPL-SCNC: 3.3 MMOL/L (ref 3.5–5.1)
REPORTED DOSE,DOSE: ABNORMAL UNITS
REPORTED DOSE/TIME,TMG: ABNORMAL
SERVICE CMNT-IMP: ABNORMAL
SERVICE CMNT-IMP: NORMAL
SODIUM SERPL-SCNC: 142 MMOL/L (ref 136–145)
VANCOMYCIN TROUGH SERPL-MCNC: 32.4 UG/ML (ref 5–10)

## 2018-06-10 PROCEDURE — 74011250637 HC RX REV CODE- 250/637: Performed by: HOSPITALIST

## 2018-06-10 PROCEDURE — 74011250637 HC RX REV CODE- 250/637: Performed by: INTERNAL MEDICINE

## 2018-06-10 PROCEDURE — 74011250636 HC RX REV CODE- 250/636: Performed by: INTERNAL MEDICINE

## 2018-06-10 PROCEDURE — 36415 COLL VENOUS BLD VENIPUNCTURE: CPT | Performed by: HOSPITALIST

## 2018-06-10 PROCEDURE — 80048 BASIC METABOLIC PNL TOTAL CA: CPT | Performed by: HOSPITALIST

## 2018-06-10 PROCEDURE — 74011250636 HC RX REV CODE- 250/636: Performed by: HOSPITALIST

## 2018-06-10 PROCEDURE — 74011636637 HC RX REV CODE- 636/637: Performed by: HOSPITALIST

## 2018-06-10 PROCEDURE — 80202 ASSAY OF VANCOMYCIN: CPT | Performed by: INTERNAL MEDICINE

## 2018-06-10 PROCEDURE — 82962 GLUCOSE BLOOD TEST: CPT

## 2018-06-10 PROCEDURE — 74011000258 HC RX REV CODE- 258: Performed by: HOSPITALIST

## 2018-06-10 RX ORDER — AMOXICILLIN AND CLAVULANATE POTASSIUM 500; 125 MG/1; MG/1
1 TABLET, FILM COATED ORAL 2 TIMES DAILY
Qty: 10 TAB | Refills: 0 | Status: SHIPPED | OUTPATIENT
Start: 2018-06-10 | End: 2018-06-15

## 2018-06-10 RX ORDER — POTASSIUM CHLORIDE 750 MG/1
40 TABLET, FILM COATED, EXTENDED RELEASE ORAL
Status: COMPLETED | OUTPATIENT
Start: 2018-06-10 | End: 2018-06-10

## 2018-06-10 RX ORDER — VANCOMYCIN HYDROCHLORIDE
1250
Status: DISCONTINUED | OUTPATIENT
Start: 2018-06-10 | End: 2018-06-10 | Stop reason: HOSPADM

## 2018-06-10 RX ORDER — FUROSEMIDE 40 MG/1
40 TABLET ORAL DAILY
Qty: 30 TAB | Refills: 0 | Status: SHIPPED | OUTPATIENT
Start: 2018-06-10 | End: 2018-06-20

## 2018-06-10 RX ORDER — FUROSEMIDE 40 MG/1
40 TABLET ORAL DAILY
Status: DISCONTINUED | OUTPATIENT
Start: 2018-06-10 | End: 2018-06-10 | Stop reason: HOSPADM

## 2018-06-10 RX ADMIN — THERA TABS 1 TABLET: TAB at 08:16

## 2018-06-10 RX ADMIN — OXYCODONE HYDROCHLORIDE 10 MG: 5 TABLET ORAL at 06:49

## 2018-06-10 RX ADMIN — OXYCODONE HYDROCHLORIDE 10 MG: 5 TABLET ORAL at 11:22

## 2018-06-10 RX ADMIN — FUROSEMIDE 40 MG: 40 TABLET ORAL at 08:15

## 2018-06-10 RX ADMIN — HYDRALAZINE HYDROCHLORIDE 20 MG: 20 INJECTION INTRAMUSCULAR; INTRAVENOUS at 07:22

## 2018-06-10 RX ADMIN — INSULIN LISPRO 2 UNITS: 100 INJECTION, SOLUTION INTRAVENOUS; SUBCUTANEOUS at 11:32

## 2018-06-10 RX ADMIN — ONDANSETRON 8 MG: 2 INJECTION INTRAMUSCULAR; INTRAVENOUS at 07:22

## 2018-06-10 RX ADMIN — POTASSIUM CHLORIDE 40 MEQ: 750 TABLET, EXTENDED RELEASE ORAL at 08:15

## 2018-06-10 RX ADMIN — Medication 100 MG: at 08:15

## 2018-06-10 RX ADMIN — FOLIC ACID 1 MG: 1 TABLET ORAL at 08:15

## 2018-06-10 RX ADMIN — VALSARTAN 40 MG: 40 TABLET, FILM COATED ORAL at 08:15

## 2018-06-10 RX ADMIN — PIPERACILLIN SODIUM,TAZOBACTAM SODIUM 3.38 G: 3; .375 INJECTION, POWDER, FOR SOLUTION INTRAVENOUS at 08:15

## 2018-06-10 RX ADMIN — CARVEDILOL 25 MG: 12.5 TABLET, FILM COATED ORAL at 08:15

## 2018-06-10 RX ADMIN — VANCOMYCIN HYDROCHLORIDE 1250 MG: 10 INJECTION, POWDER, LYOPHILIZED, FOR SOLUTION INTRAVENOUS at 04:13

## 2018-06-10 RX ADMIN — DOCUSATE SODIUM 100 MG: 100 CAPSULE, LIQUID FILLED ORAL at 08:15

## 2018-06-10 RX ADMIN — Medication 10 ML: at 04:14

## 2018-06-10 RX ADMIN — OXYCODONE HYDROCHLORIDE 10 MG: 5 TABLET ORAL at 02:36

## 2018-06-10 RX ADMIN — GLYBURIDE 2.5 MG: 5 TABLET ORAL at 08:15

## 2018-06-10 NOTE — PROGRESS NOTES
0700: Bedside report received from Omar Wilcox, off going nurse. Assumed care of patient. 1200: Discharge instructions reviewed with patient and family, including follow up appointments and medications. Patient instructed to schedule follow up appointment with podiatrist. Information on a diabetic diet given to patient. Opportunity for questions provided. Patient stable and ready for discharge.

## 2018-06-10 NOTE — PROGRESS NOTES
06/10/18 0720   Vitals   Temp 98.1 °F (36.7 °C)   Temp Source Oral   Pulse (Heart Rate) 87   Heart Rate Source Monitor   Resp Rate 18   O2 Sat (%) 97 %   Level of Consciousness Alert   BP (!) 206/103  (notified rn)   MAP (Calculated) 137   BP 1 Location Right arm   BP 1 Method Automatic   BP Patient Position At rest   MEWS Score 3   MEWS 3, /103. PRN hydralazine given. Will continue to monitor.

## 2018-06-10 NOTE — DISCHARGE SUMMARY
Hospitalist Discharge Summary     Patient ID:  Ernst Hernandez  [id number]  44 y.o.  1975    PCP on record: None    Admit date: 6/8/2018  Discharge date and time: 6/10/2018      DISCHARGE DIAGNOSIS:  DISCHARGE DIAGNOSIS:  Left lower leg and foot cellulitis: improved  Charcot foot  Osteomyelitis ruled out  Sepsis, resolved  Accelerated hypertension, poa  CKD stage II/III    DM type 2, A1c 6.8  Hepatitis C  Tobacco abuse  Hx alcohol dependence      CONSULTATIONS:  IP CONSULT TO ORTHOPEDIC SURGERY  IP CONSULT TO PODIATRY    Excerpted HPI from H&P of Marissa Davis MD:  Delta Singh is a 44 y.o.  male with PMH DM type 2, HTN, hepatitis C, hx LLE cellulitis hospitalized ED [facility] 11/2013 who presents with 1 month of swelling, redness and pain to left lower leg.  +subjective fever and night sweats. Was seen at [facility] twice by his report. First time, sent home after leg doppler negative for DVT, told to keep leg elevated. Second time hospitalized for IV abx x 2 days (this was 8 days ago), had normal echo. Discharged home on Bactrim, lasix, metformin, coreg. Said he developed nausea and vomiting while in hospital from medications he was started on and continue to have n/v until he stopped taking meds. When vomiting stopped he went back on coreg which he has been on before. Denies any current abdominal pain, n/v.  Says metformin gives him diarrhea so he doesn't always take. Asks for something stronger for pain -- says pain still uncontrolled (given oxycodone 5mg and tramadol in ER). \"  ______________________________________________________________________  DISCHARGE SUMMARY/HOSPITAL COURSE:  for full details see H&P, daily progress notes, labs, consult notes. Hospital course via problem below:    Left lower leg and foot cellulitis, POA  Charcot foot  Osteomyelitis ruled out  Sepsis, POA; resolved  -dopplers negative of DVT  -MRI Left Foot: \"IMPRESSION: 1.  Findings compatible with Charcot changes of the midfoot. Cannot exclude superimposed infection\"  -vancomycin and zosyn changed to Augmentin at discharge, cellulitis essentially resolved, patient was discharged with Augmentin  -blood cultures with NGTD  -tylenol prn pain at discharge, encouraged to avoid NSAIDs  -Podiatry recommendations noted, patient reports that he had ABIs at [place] thus I canceled order. Also patient did not need ID consult as his cellulitis had resolved.     Accelerated hypertension, poa  -Continue Coreg 25 mg BID  -PO lasix as below      CKD stage 2/3: doubt ALISON, suspect hypertensive nephropathy  -Diovan increased Cr (seems an acceptable amount but given spotty follow-up -->no insurance, I did not continue patient on an ARB. Also he has an intolerance to lisinopril and there are no ARBs on the $4 med list.      DM type 2, POA: 6.8  -resume metformin on discharge     Hepatitis C  --hep C antibody high + 2013. [facility] told borderline cirrhosis.  Refer to Dr. Yoni Murillo outpatient  --no sign of cirrhosis -- abd US 2013 with fatty liver.  No ascites on exam.  Platelet nl.      Tobacco abuse  --nicotine patch.  Advised to quit smoking by one of my partners.      Hx alcohol dependence  --used to drink daily, now just 3x/week, plans to quit      Obesity: Body mass index is 31.64 kg/(m^2). CM provided patient with [address]  _______________________________________________________________________  Patient seen and examined by me on discharge day. Pertinent Findings:  Gen:    Not in distress  Chest: Clear lungs  CVS:   Regular rhythm.   Edema in LLE unchanged  Abd:  Soft, not distended, not tender  Neuro:  Alert  Integument: cellulitis resolved  _______________________________________________________________________  DISCHARGE MEDICATIONS:   Current Discharge Medication List      START taking these medications    Details   amoxicillin-clavulanate (AUGMENTIN) 500-125 mg per tablet Take 1 Tab by mouth two (2) times a day for 5 days. Qty: 10 Tab, Refills: 0         CONTINUE these medications which have CHANGED    Details   furosemide (LASIX) 40 mg tablet Take 1 Tab by mouth daily. Qty: 30 Tab, Refills: 0         CONTINUE these medications which have NOT CHANGED    Details   carvedilol (COREG) 25 mg tablet Take 25 mg by mouth daily. diphenhydrAMINE (BENADRYL ALLERGY) 25 mg tablet Take 25 mg by mouth two (2) times a day. metFORMIN (GLUCOPHAGE) 850 mg tablet Take 850 mg by mouth two (2) times daily (with meals). STOP taking these medications       trimethoprim-sulfamethoxazole (BACTRIM DS) 160-800 mg per tablet Comments:   Reason for Stopping:               My Recommended Diet, Activity, Wound Care, and follow-up labs are listed in the patient's Discharge Insturctions which I have personally completed and reviewed. ______________________________________________________________________    Risk of deterioration: Low    Condition at Discharge:  Stable  ______________________________________________________________________    Disposition  Home with family, no needs  ______________________________________________________________________    Care Plan discussed with:   Patient, RN, Care Manager    ______________________________________________________________________    Code Status: Full Code  ______________________________________________________________________      Follow up with:   PCP : None  Follow-up Information     Follow up With Details Comments Contact Info    None In 1 week Please call PCP office of your choice to schedule new patient appointment. None [phone] Patient stated that they have no PCP      Sheri Delaney DPM In 4 days Please schedule Podiatry follow-up appointment.   [address] [facility] [address]  [phone]                Total time in minutes spent coordinating this discharge (includes going over instructions, follow-up, prescriptions, and preparing report for sign off to her PCP) :  35 minutes    Signed:  Kendall Garcia MD

## 2018-06-10 NOTE — DISCHARGE INSTRUCTIONS
HOSPITALIST DISCHARGE INSTRUCTIONS    NAME: Mamta Rios   :  1975   MRN:  [id number]     Date/Time:  6/10/2018 9:41 AM    ADMIT DATE: 2018     DISCHARGE DATE: 6/10/2018     DISCHARGE DIAGNOSIS:  Left lower leg and foot cellulitis: improved  Charcot foot  Osteomyelitis ruled out  Sepsis, resolved  Accelerated hypertension, poa  CKD stage II/III    DM type 2, A1c 6.8  Hepatitis C  Tobacco abuse  Hx alcohol dependence    MEDICATIONS:  As per medication reconciliation  list  · It is important that you take the medication exactly as they are prescribed. · Keep your medication in the bottles provided by the pharmacist and keep a list of the medication names, dosages, and times to be taken in your wallet. · Do not take other medications without consulting your doctor. · Also get an over the counter probiotic to help prevent antibiotic associated diarrhea    Pain Management: Use tylenol (acetaminophen as needed for pain)    What to do at Home    Recommended diet:  Diabetic Diet    Recommended activity: Activity as tolerated    If you have questions regarding the hospital related prescriptions or hospital related issues please call [facility] karma Morin at [phone]. If you experience any of the following symptoms then please call your primary care physician or return to the emergency room if you cannot get hold of your doctor:  Fever, chills, nausea, vomiting, diarrhea, change in mentation, falling, bleeding, shortness of breath or any other concerning symptoms. Follow Up:  [facility] and establish first available care with a PCP. Dr. Marcia Mendoza would like to see you in her office next week. I have given you her information. Please get your RANCHO reports from [place] prior to this appointment. Information obtained by :  I understand that if any problems occur once I am at home I am to contact my physician.     I understand and acknowledge receipt of the instructions indicated above.                                                                                                                                            Physician's or R.N.'s Signature                                                                  Date/Time                                                                                                                                              Patient or Representative Signature                                                          Date/Time

## 2018-06-10 NOTE — PROGRESS NOTES
Reason for Admission:  Accelerated hypertension, left leg cellulitis, L foot fracture                    RRAT Score: 6                   Plan for utilizing home health: N/A at this time, No H2H diagnosis                         Likelihood of Readmission: Low pending follow-up care adherence                          Transition of Care Plan: Follow-up Care appointments, establishing PCP and BS Care Card    Pt is a 45 yo  male admitted on 18 for accelerated hypertension, left leg cellulitis, L foot fracture. Pt lives alone in a one-story home, some family in the area that can assist as needed but typically \"on my own. \" Pt is independent in ADLs/IADLs to include driving. Pt has no reported hx of HH, SNF, or acute inpatient rehab. Pt has no reported DME at home. Pt to dc home by private vehicle with family today between 11:30AM-12:00PM (family lives 1.5 hours away). Pt to transport self or use family assistance as needed for follow-up care appointments. Pt's [facility] [facility] [address] CM met with pt to verify demographic info and complete initial assessment, dc planning. Pt is alert and oriented x 4. CM consulted to provide pt with [facility] PCP list and Care Card Application, as pt does not have insurance at this time. Pt reports he has used BS FA/Care Card in the past, but it has  and would like to apply again. CM also forwarded pt's information to [facility] in order to follow-up with the pt outpatient, as they are not in the hospital on the weekends. Pt to review PCP list and schedule own appointment. Pt also recommended to f/u with Podiatrist office in 4 days - information placed on AVS for pt to call and schedule. Pt verbalized understanding. All information entered into pt AVS.     Pt has no additional CM needs at this time. Floor nurse notified.                       Care Management Interventions  PCP Verified by CM: No (Provided PCP list, pt to look over and schedule)  Palliative Care Criteria Met (RRAT>21 & CHF Dx)?: No  Mode of Transport at Discharge:  Other (see comment) (By private vehicle with family)  Hospital Transport Time of Discharge: [facility] ( Consult): Discharge Planning  Discharge Durable Medical Equipment: No  Health Maintenance Reviewed: Yes  Physical Therapy Consult: No  Occupational Therapy Consult: No  Speech Therapy Consult: No  Current Support Network: Own Home, Lives Alone, Other (Lives alone in [place] home, some family in area to provide assistance as needed)  Confirm Follow Up Transport: Self  Plan discussed with Pt/Family/Caregiver: Yes  Discharge Location  Discharge Placement: [address], MSW [employer] in Social Work, [address] [place] [address]  [phone]

## 2018-06-10 NOTE — PROGRESS NOTES
Problem: Falls - Risk of  Goal: *Absence of Falls  Document Raymond Fall Risk and appropriate interventions in the flowsheet.    Outcome: Progressing Towards Goal  Fall Risk Interventions:            Medication Interventions: Patient to call before getting OOB, Teach patient to arise slowly

## 2018-06-10 NOTE — PROGRESS NOTES
Pharmacy Automatic Renal Dosing Protocol - Antimicrobials    Indication for Antimicrobials: LLE cellulitis with Charcot foot deformity      Current Regimen of Each Antimicrobial:  Zosyn 3.375 g IV every 8 hours (Start Date ; Day # 3)  Vancomycin 1250 mg IV every 12 hours (Start date: ; day #3)    Previous Antimicrobial Therapy:  Pt apparently received IV abx at [facility] a little over a week ago and was discharged on bactrim. The pharmacy student who clarified the patient's PTA medication regimen confirmed that patient did not take bactrim as an outpatient prior to today's admission. Goal Level: VANCOMYCIN TROUGH GOAL RANGE  Vancomycin Trough: 15 - 20 mcg/mL    Date Dose & Interval Measured (mcg/mL) Extrapolated (mcg/mL)   6/10/18 @ 04:12 1250 mg every 12 hours 32.4 mcg/mL 31.8 mcg/mL                 Significant Cultures:    Blood: NGTD x 2 days- pending    Radiology / Imaging results: (X-ray, CT scan or MRI):    L foot XRay: lisfranc fx dislocation, infection cannot be ruled out    Paralysis, amputations, malnutrition: N/A    Labs:  Recent Labs      06/10/18   0412  18   0200  18   1003   CREA  1.60*  1.47*  1.30   BUN  20  20  18   WBC   --   11.4*  14.1*     Temp (24hrs), Av.9 °F (36.6 °C), Min:97.3 °F (36.3 °C), Max:98.6 °F (37 °C)    Creatinine Clearance (mL/min) or Dialysis: 60 mL/min    Impression/Plan:   · Vancomycin trough resulted as supra-therapeutic at 31.8 mcg/mL. Will change vancomycin to 1250 mg IV every 18 hours for an estimated trough of 18.4 mcg/mL. · Will continue current regimen  for Zosyn as appropriate for indication and renal function. The renal function did worsen again today but the Zosyn is still appropriate. · Daily BMP ordered per protocol  · Antimicrobial stop date TBD     Pharmacy will follow daily and adjust medications as appropriate for renal function and/or serum levels.     Thank you,  Long Raya, PHARMD    Recommended duration of therapy  http://[facility]/[place]/[facility]/Pharmacy/Clinical%20Companion/Duration%20of%20ABX%20therapy. docx    Renal Dosing  http://[facility]/[place]/[facility]/Pharmacy/Clinical%20Companion/Renal%[id number]. pdf

## 2018-06-13 LAB
BACTERIA SPEC CULT: NORMAL
SERVICE CMNT-IMP: NORMAL

## 2018-06-20 ENCOUNTER — HOSPITAL ENCOUNTER (EMERGENCY)
Age: 43
Discharge: HOME | End: 2018-06-20
Attending: EMERGENCY MEDICINE
Payer: SUBSIDIZED

## 2018-06-20 ENCOUNTER — APPOINTMENT (OUTPATIENT)
Dept: GENERAL RADIOLOGY | Age: 43
End: 2018-06-20
Attending: PHYSICIAN ASSISTANT
Payer: SUBSIDIZED

## 2018-06-20 ENCOUNTER — OFFICE VISIT (OUTPATIENT)
Dept: FAMILY MEDICINE CLINIC | Age: 43
End: 2018-06-20

## 2018-06-20 VITALS
RESPIRATION RATE: 15 BRPM | DIASTOLIC BLOOD PRESSURE: 113 MMHG | BODY MASS INDEX: 31.54 KG/M2 | SYSTOLIC BLOOD PRESSURE: 187 MMHG | TEMPERATURE: 98.3 F | HEART RATE: 90 BPM | WEIGHT: 212.96 LBS | HEIGHT: 69 IN | OXYGEN SATURATION: 100 %

## 2018-06-20 VITALS
DIASTOLIC BLOOD PRESSURE: 124 MMHG | RESPIRATION RATE: 14 BRPM | TEMPERATURE: 96.2 F | BODY MASS INDEX: 31.4 KG/M2 | HEART RATE: 90 BPM | HEIGHT: 69 IN | SYSTOLIC BLOOD PRESSURE: 216 MMHG | WEIGHT: 212 LBS | OXYGEN SATURATION: 97 %

## 2018-06-20 DIAGNOSIS — I10 ESSENTIAL HYPERTENSION: Primary | ICD-10-CM

## 2018-06-20 DIAGNOSIS — I10 MALIGNANT HYPERTENSION: Primary | ICD-10-CM

## 2018-06-20 DIAGNOSIS — M79.605 CHRONIC PAIN OF LEFT LOWER EXTREMITY: ICD-10-CM

## 2018-06-20 DIAGNOSIS — M25.472 LEFT ANKLE SWELLING: ICD-10-CM

## 2018-06-20 DIAGNOSIS — G89.29 CHRONIC PAIN OF LEFT LOWER EXTREMITY: ICD-10-CM

## 2018-06-20 DIAGNOSIS — E11.21 TYPE 2 DIABETES WITH NEPHROPATHY (HCC): ICD-10-CM

## 2018-06-20 DIAGNOSIS — L03.116 LEFT LEG CELLULITIS: ICD-10-CM

## 2018-06-20 PROBLEM — N17.9 AKI (ACUTE KIDNEY INJURY) (HCC): Status: RESOLVED | Noted: 2018-06-09 | Resolved: 2018-06-20

## 2018-06-20 LAB
ALBUMIN SERPL-MCNC: 1.8 G/DL (ref 3.5–5)
ALBUMIN/GLOB SERPL: 0.4 {RATIO} (ref 1.1–2.2)
ALP SERPL-CCNC: 186 U/L (ref 45–117)
ALT SERPL-CCNC: 25 U/L (ref 12–78)
ANION GAP SERPL CALC-SCNC: 7 MMOL/L (ref 5–15)
AST SERPL-CCNC: 31 U/L (ref 15–37)
BASOPHILS # BLD: 0.1 K/UL (ref 0–0.1)
BASOPHILS NFR BLD: 1 % (ref 0–1)
BILIRUB SERPL-MCNC: 0.2 MG/DL (ref 0.2–1)
BUN SERPL-MCNC: 19 MG/DL (ref 6–20)
BUN/CREAT SERPL: 10 (ref 12–20)
CALCIUM SERPL-MCNC: 8.4 MG/DL (ref 8.5–10.1)
CHLORIDE SERPL-SCNC: 108 MMOL/L (ref 97–108)
CK SERPL-CCNC: 123 U/L (ref 39–308)
CO2 SERPL-SCNC: 25 MMOL/L (ref 21–32)
CREAT SERPL-MCNC: 1.83 MG/DL (ref 0.7–1.3)
DIFFERENTIAL METHOD BLD: ABNORMAL
EOSINOPHIL # BLD: 0.3 K/UL (ref 0–0.4)
EOSINOPHIL NFR BLD: 3 % (ref 0–7)
ERYTHROCYTE [DISTWIDTH] IN BLOOD BY AUTOMATED COUNT: 14.8 % (ref 11.5–14.5)
ERYTHROCYTE [SEDIMENTATION RATE] IN BLOOD: 66 MM/HR (ref 0–15)
GLOBULIN SER CALC-MCNC: 4.4 G/DL (ref 2–4)
GLUCOSE SERPL-MCNC: 176 MG/DL (ref 65–100)
HCT VFR BLD AUTO: 37.5 % (ref 36.6–50.3)
HGB BLD-MCNC: 12.1 G/DL (ref 12.1–17)
IMM GRANULOCYTES # BLD: 0.1 K/UL (ref 0–0.04)
IMM GRANULOCYTES NFR BLD AUTO: 1 % (ref 0–0.5)
LACTATE SERPL-SCNC: 0.6 MMOL/L (ref 0.4–2)
LYMPHOCYTES # BLD: 1.7 K/UL (ref 0.8–3.5)
LYMPHOCYTES NFR BLD: 16 % (ref 12–49)
MCH RBC QN AUTO: 28.8 PG (ref 26–34)
MCHC RBC AUTO-ENTMCNC: 32.3 G/DL (ref 30–36.5)
MCV RBC AUTO: 89.3 FL (ref 80–99)
MONOCYTES # BLD: 1.2 K/UL (ref 0–1)
MONOCYTES NFR BLD: 11 % (ref 5–13)
NEUTS SEG # BLD: 7.3 K/UL (ref 1.8–8)
NEUTS SEG NFR BLD: 69 % (ref 32–75)
NRBC # BLD: 0 K/UL (ref 0–0.01)
NRBC BLD-RTO: 0 PER 100 WBC
PLATELET # BLD AUTO: 339 K/UL (ref 150–400)
PMV BLD AUTO: 10.6 FL (ref 8.9–12.9)
POTASSIUM SERPL-SCNC: 3.7 MMOL/L (ref 3.5–5.1)
PROT SERPL-MCNC: 6.2 G/DL (ref 6.4–8.2)
RBC # BLD AUTO: 4.2 M/UL (ref 4.1–5.7)
SODIUM SERPL-SCNC: 140 MMOL/L (ref 136–145)
TROPONIN I SERPL-MCNC: <0.05 NG/ML
WBC # BLD AUTO: 10.6 K/UL (ref 4.1–11.1)

## 2018-06-20 PROCEDURE — 36415 COLL VENOUS BLD VENIPUNCTURE: CPT

## 2018-06-20 PROCEDURE — 73630 X-RAY EXAM OF FOOT: CPT

## 2018-06-20 PROCEDURE — 87040 BLOOD CULTURE FOR BACTERIA: CPT

## 2018-06-20 PROCEDURE — 82550 ASSAY OF CK (CPK): CPT

## 2018-06-20 PROCEDURE — 99282 EMERGENCY DEPT VISIT SF MDM: CPT

## 2018-06-20 PROCEDURE — 80053 COMPREHEN METABOLIC PANEL: CPT

## 2018-06-20 PROCEDURE — 84484 ASSAY OF TROPONIN QUANT: CPT

## 2018-06-20 PROCEDURE — 85652 RBC SED RATE AUTOMATED: CPT

## 2018-06-20 PROCEDURE — 83605 ASSAY OF LACTIC ACID: CPT

## 2018-06-20 PROCEDURE — 85025 COMPLETE CBC W/AUTO DIFF WBC: CPT

## 2018-06-20 PROCEDURE — 74011250637 HC RX REV CODE- 250/637: Performed by: EMERGENCY MEDICINE

## 2018-06-20 RX ORDER — FUROSEMIDE 40 MG/1
40 TABLET ORAL 2 TIMES DAILY
Qty: 30 TAB | Refills: 0 | Status: SHIPPED | OUTPATIENT
Start: 2018-06-20 | End: 2019-06-10

## 2018-06-20 RX ORDER — OXYCODONE AND ACETAMINOPHEN 5; 325 MG/1; MG/1
TABLET ORAL
Refills: 0 | COMMUNITY
Start: 2018-05-15 | End: 2018-06-20 | Stop reason: ALTCHOICE

## 2018-06-20 RX ORDER — CARVEDILOL 12.5 MG/1
25 TABLET ORAL
Status: COMPLETED | OUTPATIENT
Start: 2018-06-20 | End: 2018-06-20

## 2018-06-20 RX ORDER — CLINDAMYCIN HYDROCHLORIDE 150 MG/1
CAPSULE ORAL
Refills: 0 | COMMUNITY
Start: 2018-05-15 | End: 2018-06-20 | Stop reason: ALTCHOICE

## 2018-06-20 RX ORDER — TRAMADOL HYDROCHLORIDE 50 MG/1
100 TABLET ORAL
Status: COMPLETED | OUTPATIENT
Start: 2018-06-20 | End: 2018-06-20

## 2018-06-20 RX ADMIN — TRAMADOL HYDROCHLORIDE 100 MG: 50 TABLET, FILM COATED ORAL at 15:18

## 2018-06-20 RX ADMIN — CARVEDILOL 25 MG: 12.5 TABLET, FILM COATED ORAL at 14:21

## 2018-06-20 NOTE — PATIENT INSTRUCTIONS
Cellulitis: Care Instructions  Your Care Instructions    Cellulitis is a skin infection. It often occurs after a break in the skin from a scrape, cut, bite, or puncture, or after a rash. The doctor has checked you carefully, but problems can develop later. If you notice any problems or new symptoms, get medical treatment right away. Follow-up care is a key part of your treatment and safety. Be sure to make and go to all appointments, and call your doctor if you are having problems. It's also a good idea to know your test results and keep a list of the medicines you take. How can you care for yourself at home? · Take your antibiotics as directed. Do not stop taking them just because you feel better. You need to take the full course of antibiotics. · Prop up the infected area on pillows to reduce pain and swelling. Try to keep the area above the level of your heart as often as you can. · If your doctor told you how to care for your wound, follow your doctor's instructions. If you did not get instructions, follow this general advice:  ¨ Wash the wound with clean water 2 times a day. Don't use hydrogen peroxide or alcohol, which can slow healing. ¨ You may cover the wound with a thin layer of petroleum jelly, such as Vaseline, and a nonstick bandage. ¨ Apply more petroleum jelly and replace the bandage as needed. · Be safe with medicines. Take pain medicines exactly as directed. ¨ If the doctor gave you a prescription medicine for pain, take it as prescribed. ¨ If you are not taking a prescription pain medicine, ask your doctor if you can take an over-the-counter medicine. To prevent cellulitis in the future  · Try to prevent cuts, scrapes, or other injuries to your skin. Cellulitis most often occurs where there is a break in the skin. · If you get a scrape, cut, mild burn, or bite, wash the wound with clean water as soon as you can to help avoid infection.  Don't use hydrogen peroxide or alcohol, which can slow healing. · If you have swelling in your legs (edema), support stockings and good skin care may help prevent leg sores and cellulitis. · Take care of your feet, especially if you have diabetes or other conditions that increase the risk of infection. Wear shoes and socks. Do not go barefoot. If you have athlete's foot or other skin problems on your feet, talk to your doctor about how to treat them. When should you call for help? Call your doctor now or seek immediate medical care if:  ? · You have signs that your infection is getting worse, such as:  ¨ Increased pain, swelling, warmth, or redness. ¨ Red streaks leading from the area. ¨ Pus draining from the area. ¨ A fever. ? · You get a rash. ? Watch closely for changes in your health, and be sure to contact your doctor if:  ? · You are not getting better after 1 day (24 hours). ? · You do not get better as expected. Where can you learn more? Go to http://merari-reva.info/. Dano Ambriz in the search box to learn more about \"Cellulitis: Care Instructions. \"  Current as of: October 13, 2016  Content Version: 11.4  © 6369-0759 [employer]. Care instructions adapted under license by [employer] (which disclaims liability or warranty for this information). If you have questions about a medical condition or this instruction, always ask your healthcare professional. [place] [address] any warranty or liability for your use of this information. High Blood Pressure: Care Instructions  Your Care Instructions    If your blood pressure is usually above 140/90, you have high blood pressure, or hypertension. That means the top number is 140 or higher or the bottom number is 90 or higher, or both. Despite what a lot of people think, high blood pressure usually doesn't cause headaches or make you feel dizzy or lightheaded. It usually has no symptoms.  But it does increase your risk for heart attack, stroke, and kidney or eye damage. The higher your blood pressure, the more your risk increases. Your doctor will give you a goal for your blood pressure. Your goal will be based on your health and your age. An example of a goal is to keep your blood pressure below 140/90. Lifestyle changes, such as eating healthy and being active, are always important to help lower blood pressure. You might also take medicine to reach your blood pressure goal.  Follow-up care is a key part of your treatment and safety. Be sure to make and go to all appointments, and call your doctor if you are having problems. It's also a good idea to know your test results and keep a list of the medicines you take. How can you care for yourself at home? Medical treatment  · If you stop taking your medicine, your blood pressure will go back up. You may take one or more types of medicine to lower your blood pressure. Be safe with medicines. Take your medicine exactly as prescribed. Call your doctor if you think you are having a problem with your medicine. · Talk to your doctor before you start taking aspirin every day. Aspirin can help certain people lower their risk of a heart attack or stroke. But taking aspirin isn't right for everyone, because it can cause serious bleeding. · See your doctor regularly. You may need to see the doctor more often at first or until your blood pressure comes down. · If you are taking blood pressure medicine, talk to your doctor before you take decongestants or anti-inflammatory medicine, such as ibuprofen. Some of these medicines can raise blood pressure. · Learn how to check your blood pressure at home. Lifestyle changes  · Stay at a healthy weight. This is especially important if you put on weight around the waist. Losing even 10 pounds can help you lower your blood pressure. · If your doctor recommends it, get more exercise. Walking is a good choice.  Bit by bit, increase the amount you walk every day. Try for at least 30 minutes on most days of the week. You also may want to swim, bike, or do other activities. · Avoid or limit alcohol. Talk to your doctor about whether you can drink any alcohol. · Try to limit how much sodium you eat to less than 2,300 milligrams (mg) a day. Your doctor may ask you to try to eat less than 1,500 mg a day. · Eat plenty of fruits (such as bananas and oranges), vegetables, legumes, whole grains, and low-fat dairy products. · Lower the amount of saturated fat in your diet. Saturated fat is found in animal products such as milk, cheese, and meat. Limiting these foods may help you lose weight and also lower your risk for heart disease. · Do not smoke. Smoking increases your risk for heart attack and stroke. If you need help quitting, talk to your doctor about stop-smoking programs and medicines. These can increase your chances of quitting for good. When should you call for help? Call 911 anytime you think you may need emergency care. This may mean having symptoms that suggest that your blood pressure is causing a serious heart or blood vessel problem. Your blood pressure may be over 180/110. ? For example, call 911 if:  ? · You have symptoms of a heart attack. These may include:  ¨ Chest pain or pressure, or a strange feeling in the chest.  ¨ Sweating. ¨ Shortness of breath. ¨ Nausea or vomiting. ¨ Pain, pressure, or a strange feeling in the back, neck, jaw, or upper belly or in one or both shoulders or arms. ¨ Lightheadedness or sudden weakness. ¨ A fast or irregular heartbeat. ? · You have symptoms of a stroke. These may include:  ¨ Sudden numbness, tingling, weakness, or loss of movement in your face, arm, or leg, especially on only one side of your body. ¨ Sudden vision changes. ¨ Sudden trouble speaking. ¨ Sudden confusion or trouble understanding simple statements. ¨ Sudden problems with walking or balance.   ¨ A sudden, severe headache that is different from past headaches. ? · You have severe back or belly pain. ?Do not wait until your blood pressure comes down on its own. Get help right away. ?Call your doctor now or seek immediate care if:  ? · Your blood pressure is much higher than normal (such as 180/110 or higher), but you don't have symptoms. ? · You think high blood pressure is causing symptoms, such as:  ¨ Severe headache. ¨ Blurry vision. ? Watch closely for changes in your health, and be sure to contact your doctor if:  ? · Your blood pressure measures 140/90 or higher at least 2 times. That means the top number is 140 or higher or the bottom number is 90 or higher, or both. ? · You think you may be having side effects from your blood pressure medicine. ? · Your blood pressure is usually normal, but it goes above normal at least 2 times. Where can you learn more? Go to http://merari-reva.info/. Enter G999 in the search box to learn more about \"High Blood Pressure: Care Instructions. \"  Current as of: September 21, 2016  Content Version: 11.4  © 2684-7765 [employer]. Care instructions adapted under license by [employer] (which disclaims liability or warranty for this information). If you have questions about a medical condition or this instruction, always ask your healthcare professional. [place] [address] any warranty or liability for your use of this information.

## 2018-06-20 NOTE — ED PROVIDER NOTES
PROVIDER IN TRIAGE NOTE:  12:37 PM  [employer] has evaluated the patient as the Provider in Triage (PIT) for left foot cellulitis and elevated blood pressure. They have reviewed the vital signs and the triage nurse assessment. They have talked with the patient and any available family and advised that the appropriate studies have been ordered to initiate the work up based on the clinical presentation during the assessment. The pt has been advised that they will be accommodated in the Main ED as soon as possible. The pt has been requested to contact the triage nurse or PIT immediately if they experiences any changes in their condition during this brief waiting period. EMERGENCY DEPARTMENT HISTORY AND PHYSICAL EXAM      Date: 6/20/2018  Patient Name: Nette Ambrosio    History of Presenting Illness     Chief Complaint   Patient presents with    Foot Pain     Ambulatory w/ c/o L foot pain & being told his BP was high & to come to the ED. Pt was admitted in recently for cellulitis & HTN.     Hypertension       History Provided By: Patient    HPI: Nette Ambrosio, 44 y.o. male with PMHx significant for DM, pancreatitis, hepatitis C, and HTN, presents ambulatory to the ED with cc of constant LLE swelling with associated mild pain. Pt reports onset s/p admission for cellulitis of LLE on 6/8/2018. He notes he takes Lasix 40mg daily and elevates his leg with mild relief. He reports taking Tylenol without relief and states naproxen makes him feel nauseous. Pt notes he was evaluated at his PCP today for his symptoms and his BP was elevated at 216/124. He notes he did not take his Coreg this morning as prescribed. He notes he has PCP follow up next week. Pt specifically denies any CP, SOB, ABD pain, HA, or NV. There are no other complaints, changes, or physical findings at this time.     PCP: Kvng Hummel NP    Current Outpatient Prescriptions   Medication Sig Dispense Refill    furosemide (LASIX) 40 mg tablet Take 1 Tab by mouth two (2) times a day. 30 Tab 0    carvedilol (COREG) 25 mg tablet Take 25 mg by mouth daily.  metFORMIN (GLUCOPHAGE) 850 mg tablet Take 850 mg by mouth two (2) times daily (with meals).  diphenhydrAMINE (BENADRYL ALLERGY) 25 mg tablet Take 25 mg by mouth two (2) times a day. Past History     Past Medical History:  Past Medical History:   Diagnosis Date    Cellulitis of left lower leg 11/2013    Diabetes ([address]     type 2    Hepatitis C     Hypertension     Liver disease     Cirrohsis and hep C    Pancreatitis        Past Surgical History:  Past Surgical History:   Procedure Laterality Date    HX APPENDECTOMY      HX ORTHOPAEDIC Right 2011    infected cyst removed from right leg in [address]       Family History:  Family History   Problem Relation Age of Onset    Diabetes Mother      legally blind    Heart Disease Mother      CHF    Kidney Disease Mother      on dialysis    Diabetes Father     Stroke Sister     Other Sister      arianne acuña       Social History:  Social History   Substance Use Topics    Smoking status: Current Every Day Smoker     Packs/day: 1.00    Smokeless tobacco: Never Used    Alcohol use No      Comment: 80 oz beer 3x/week       Allergies: Allergies   Allergen Reactions    Lisinopril Photosensitivity and Vertigo         Review of Systems   Review of Systems   Constitutional: Negative. Negative for chills, diaphoresis and fever. HENT: Negative. Negative for congestion, ear pain, sore throat and trouble swallowing. Eyes: Negative. Negative for photophobia, pain, redness and visual disturbance. Respiratory: Negative. Negative for cough, chest tightness, shortness of breath and wheezing. Cardiovascular: Positive for leg swelling (LLE). Negative for chest pain and palpitations. Gastrointestinal: Negative. Negative for abdominal pain, blood in stool, diarrhea, nausea and vomiting.    Genitourinary: Negative for dysuria and frequency. Musculoskeletal: Positive for myalgias (LLE). Negative for back pain, joint swelling and neck pain. Skin: Negative. Neurological: Negative. Negative for seizures, syncope and headaches. Psychiatric/Behavioral: Negative. Negative for behavioral problems and confusion. The patient is not nervous/anxious. All other systems reviewed and are negative. Physical Exam   Physical Exam   Constitutional: He is oriented to person, place, and time. He appears well-developed and well-nourished. HENT:   Head: Normocephalic and atraumatic. Eyes: Conjunctivae and EOM are normal.   Neck: Normal range of motion. Neck supple. Cardiovascular: Normal rate and regular rhythm. Significant edema of LLE with erythema. Baseline 1+ pitting edema. Pulmonary/Chest: Effort normal and breath sounds normal. No respiratory distress. Abdominal: Soft. He exhibits no distension. There is no tenderness. Musculoskeletal: Normal range of motion. Neurological: He is alert and oriented to person, place, and time. Skin: Skin is warm and dry. Psychiatric: He has a normal mood and affect. Nursing note and vitals reviewed. Diagnostic Study Results     Labs -     Recent Results (from the past 12 hour(s))   CBC WITH AUTOMATED DIFF    Collection Time: 06/20/18  1:25 PM   Result Value Ref Range    WBC 10.6 4.1 - 11.1 K/uL    RBC 4.20 4. 10 - 5.70 M/uL    HGB 12.1 12.1 - 17.0 g/dL    HCT 37.5 36.6 - 50.3 %    MCV 89.3 80.0 - 99.0 FL    MCH 28.8 26.0 - 34.0 PG    MCHC 32.3 30.0 - 36.5 g/dL    RDW 14.8 (H) 11.5 - 14.5 %    PLATELET 914 407 - 655 K/uL    MPV 10.6 8.9 - 12.9 FL    NRBC 0.0 0  WBC    ABSOLUTE NRBC 0.00 0.00 - 0.01 K/uL    NEUTROPHILS 69 32 - 75 %    LYMPHOCYTES 16 12 - 49 %    MONOCYTES 11 5 - 13 %    EOSINOPHILS 3 0 - 7 %    BASOPHILS 1 0 - 1 %    IMMATURE GRANULOCYTES 1 (H) 0.0 - 0.5 %    ABS. NEUTROPHILS 7.3 1.8 - 8.0 K/UL    ABS. LYMPHOCYTES 1.7 0.8 - 3.5 K/UL    ABS.  MONOCYTES 1.2 (H) 0.0 - 1.0 K/UL    ABS. EOSINOPHILS 0.3 0.0 - 0.4 K/UL    ABS. BASOPHILS 0.1 0.0 - 0.1 K/UL    ABS. IMM. GRANS. 0.1 (H) 0.00 - 0.04 K/UL    DF AUTOMATED     METABOLIC PANEL, COMPREHENSIVE    Collection Time: 06/20/18  1:25 PM   Result Value Ref Range    Sodium 140 136 - 145 mmol/L    Potassium 3.7 3.5 - 5.1 mmol/L    Chloride 108 97 - 108 mmol/L    CO2 25 21 - 32 mmol/L    Anion gap 7 5 - 15 mmol/L    Glucose 176 (H) 65 - 100 mg/dL    BUN 19 6 - 20 MG/DL    Creatinine 1.83 (H) 0.70 - 1.30 MG/DL    BUN/Creatinine ratio 10 (L) 12 - 20      GFR est AA 49 (L) >60 ml/min/1.73m2    GFR est non-AA 41 (L) >60 ml/min/1.73m2    Calcium 8.4 (L) 8.5 - 10.1 MG/DL    Bilirubin, total 0.2 0.2 - 1.0 MG/DL    ALT (SGPT) 25 12 - 78 U/L    AST (SGOT) 31 15 - 37 U/L    Alk. phosphatase 186 (H) 45 - 117 U/L    Protein, total 6.2 (L) 6.4 - 8.2 g/dL    Albumin 1.8 (L) 3.5 - 5.0 g/dL    Globulin 4.4 (H) 2.0 - 4.0 g/dL    A-G Ratio 0.4 (L) 1.1 - 2.2     SED RATE (ESR)    Collection Time: 06/20/18  1:25 PM   Result Value Ref Range    Sed rate, automated 66 (H) 0 - 15 mm/hr   TROPONIN I    Collection Time: 06/20/18  1:25 PM   Result Value Ref Range    Troponin-I, Qt. <0.05 <0.05 ng/mL   CK W/ REFLX CKMB    Collection Time: 06/20/18  1:25 PM   Result Value Ref Range     39 - 308 U/L   LACTIC ACID    Collection Time: 06/20/18  1:25 PM   Result Value Ref Range    Lactic acid 0.6 0.4 - 2.0 MMOL/L       Radiologic Studies -   EXAM:  XR FOOT LT MIN 3 V     INDICATION:   left foot cellulitis.     COMPARISON:  6/8/2018     FINDINGS:  Three views of the left foot demonstrate the previously described  tarsometatarsal dislocation with destructive changes. There is also periosteal  new bone noted along the first metatarsal similar to the previous examination. No gas is noted in the soft tissues. There is generalized soft tissue  thickening.     IMPRESSION  IMPRESSION:        Findings are again consistent with Charcot foot.  Tarsal-metatarsal dislocation  and destruction again noted. No change. .                  Medical Decision Making   I am the first provider for this patient. I reviewed the vital signs, available nursing notes, past medical history, past surgical history, family history and social history. Vital Signs-Reviewed the patient's vital signs. Patient Vitals for the past 12 hrs:   Temp Pulse Resp BP SpO2   06/20/18 1140 98.3 °F (36.8 °C) 90 15 (!) 187/113 100 %     Records Reviewed: Nursing Notes, Old Medical Records, Previous electrocardiograms, Previous Radiology Studies and Previous Laboratory Studies    Provider Notes (Medical Decision Making):   Patient presents with asymptomatic hypertension. Likely essential hypertension rather than secondary from renal or endocrine cause. No symptoms like CP or SOB or HA to be concerned about end-organ damage and malignant hypertension at this time. For his HTN given dose of coreg that he didn't take earlier. For his chronic leg swelling due to cellulitis and possible lymphadema, increased lasix, advised leg elevation and compression stockings. No indication for DVT study at this time. Discussed sodium restriction, maintaining ideal body weight and regular exercise program as physiologic means to achieve blood pressure control. The patient will strive towards this. Meanwhile, it is appropriate to lower BP with medications, while observing for therapeutic effect and if appropriate later, can discuss discontinuing medications with his primary care physician if physiologic methods appear to be effective. The patient indicates understanding of these issues and agrees with the plan. We have agreed that continuing to lower BP in the Emergency Room at this point could be more deleterious than therapeutic. Discussed the long term sequelae for elevated blood pressure including blindness, CVA, MI, and renal failure/ dialysis. Pt agrees to follow up as directed.        ED Course:   Initial assessment performed. The patients presenting problems have been discussed, and they are in agreement with the care plan formulated and outlined with them. I have encouraged them to ask questions as they arise throughout their visit. Critical Care Time:   0    Disposition:  DISCHARGE NOTE  3:27 PM  The patient has been re-evaluated and is ready for discharge. Reviewed available results with patient. Counseled pt on diagnosis and care plan. Pt has expressed understanding, and all questions have been answered. Pt agrees with plan and agrees to follow up as recommended, or return to the ED if their symptoms worsen. Discharge instructions have been provided and explained to the pt, along with reasons to return to the ED. PLAN:  1. Discharge Medication List as of 6/20/2018  3:16 PM      CONTINUE these medications which have CHANGED    Details   furosemide (LASIX) 40 mg tablet Take 1 Tab by mouth two (2) times a day., Normal, Disp-30 Tab, R-0         CONTINUE these medications which have NOT CHANGED    Details   carvedilol (COREG) 25 mg tablet Take 25 mg by mouth daily. , Historical Med      metFORMIN (GLUCOPHAGE) 850 mg tablet Take 850 mg by mouth two (2) times daily (with meals). , Historical Med      diphenhydrAMINE (BENADRYL ALLERGY) 25 mg tablet Take 25 mg by mouth two (2) times a day., Historical Med           2. Follow-up Information     Follow up With Details Comments [address], NP  At your appointment [address]  [address]  [phone]          Return to ED if worse     Diagnosis     Clinical Impression:   1. Essential hypertension    2. Left ankle swelling    3. Chronic pain of left lower extremity      Attestations: This note is prepared by Reece Riedel, acting as Scribe for Kalli Lindsey M.D. Kalli Lindsey M.D: The scribe's documentation has been prepared under my direction and personally reviewed by me in its entirety.  I confirm that the note above accurately reflects all work, treatment, procedures, and medical decision making performed by me.

## 2018-06-20 NOTE — ED NOTES
Pt was not seen for complete triage. MD saw and evaluated pt. MD Harinder Arriaga reviewed discharge instructions with the patient. The patient verbalized understanding.

## 2018-06-20 NOTE — PROGRESS NOTES
Wm Alvares  Identified pt with two pt identifiers(name and ). Chief Complaint   Patient presents with    New Patient       1. Have you been to the ER, urgent care clinic since your last visit? Hospitalized since your last visit? ED 18    2. Have you seen or consulted any other health care providers outside of the [address] [place] [address] since your last visit? Include any pap smears or colon screening. NO      Provider notified of reason for visit, vitals and flowsheets obtained on patients.      Patient received paperwork for advance directive during previous visit but has not completed at this time     Reviewed record In preparation for visit, huddled with provider and have obtained necessary documentation      Health Maintenance Due   Topic    LIPID PANEL Q1     FOOT EXAM Q1     MICROALBUMIN Q1     EYE EXAM RETINAL OR DILATED Q1     Pneumococcal 19-64 Medium Risk (1 of 1 - PPSV23)    DTaP/Tdap/Td series (1 - Tdap)       Wt Readings from Last 3 Encounters:   18 212 lb (96.2 kg)   18 214 lb 4.6 oz (97.2 kg)   14 173 lb 4.5 oz (78.6 kg)     Temp Readings from Last 3 Encounters:   06/10/18 97.4 °F (36.3 °C)   14 98.8 °F (37.1 °C)   13 97.1 °F (36.2 °C) (Oral)     BP Readings from Last 3 Encounters:   06/10/18 123/65   14 140/87   13 (!) 143/94     Pulse Readings from Last 3 Encounters:   06/10/18 84   14 100   13 97     Vitals:    18 1007   Resp: 14   SpO2: 97%   Weight: 212 lb (96.2 kg)   Height: 5' 9\" (1.753 m)   PainSc:   6   PainLoc: Foot         Learning Assessment:  :     Learning Assessment 2018   PRIMARY LEARNER Patient   HIGHEST LEVEL OF EDUCATION - PRIMARY LEARNER  GRADUATED HIGH SCHOOL OR GED   BARRIERS PRIMARY LEARNER NONE   CO-LEARNER CAREGIVER No   PRIMARY LANGUAGE ENGLISH   LEARNER PREFERENCE PRIMARY LISTENING   ANSWERED BY patient   RELATIONSHIP SELF       Depression Screening:  :     PHQ over the last two weeks 6/20/2018   Little interest or pleasure in doing things Not at all   Feeling down, depressed or hopeless Not at all   Total Score PHQ 2 0           ADL Screening:  :     ADL Assessment 6/20/2018   Feeding yourself No Help Needed   Getting from bed to chair No Help Needed   Getting dressed No Help Needed   Bathing or showering No Help Needed   Walk across the room (includes cane/walker) No Help Needed   Using the telphone No Help Needed   Taking your medications No Help Needed   Preparing meals No Help Needed   Managing money (expenses/bills) No Help Needed   Moderately strenuous housework (laundry) No Help Needed   Shopping for personal items (toiletries/medicines) No Help Needed   Shopping for groceries No Help Needed   Driving No Help Needed   Climbing a flight of stairs No Help Needed   Getting to places beyond walking distances No Help Needed         Medication reconciliation up to date and corrected with patient at this time.

## 2018-06-20 NOTE — DISCHARGE INSTRUCTIONS
High Blood Pressure: Care Instructions  Your Care Instructions    If your blood pressure is usually above 140/90, you have high blood pressure, or hypertension. That means the top number is 140 or higher or the bottom number is 90 or higher, or both. Despite what a lot of people think, high blood pressure usually doesn't cause headaches or make you feel dizzy or lightheaded. It usually has no symptoms. But it does increase your risk for heart attack, stroke, and kidney or eye damage. The higher your blood pressure, the more your risk increases. Your doctor will give you a goal for your blood pressure. Your goal will be based on your health and your age. An example of a goal is to keep your blood pressure below 140/90. Lifestyle changes, such as eating healthy and being active, are always important to help lower blood pressure. You might also take medicine to reach your blood pressure goal.  Follow-up care is a key part of your treatment and safety. Be sure to make and go to all appointments, and call your doctor if you are having problems. It's also a good idea to know your test results and keep a list of the medicines you take. How can you care for yourself at home? Medical treatment  · If you stop taking your medicine, your blood pressure will go back up. You may take one or more types of medicine to lower your blood pressure. Be safe with medicines. Take your medicine exactly as prescribed. Call your doctor if you think you are having a problem with your medicine. · Talk to your doctor before you start taking aspirin every day. Aspirin can help certain people lower their risk of a heart attack or stroke. But taking aspirin isn't right for everyone, because it can cause serious bleeding. · See your doctor regularly. You may need to see the doctor more often at first or until your blood pressure comes down.   · If you are taking blood pressure medicine, talk to your doctor before you take decongestants or anti-inflammatory medicine, such as ibuprofen. Some of these medicines can raise blood pressure. · Learn how to check your blood pressure at home. Lifestyle changes  · Stay at a healthy weight. This is especially important if you put on weight around the waist. Losing even 10 pounds can help you lower your blood pressure. · If your doctor recommends it, get more exercise. Walking is a good choice. Bit by bit, increase the amount you walk every day. Try for at least 30 minutes on most days of the week. You also may want to swim, bike, or do other activities. · Avoid or limit alcohol. Talk to your doctor about whether you can drink any alcohol. · Try to limit how much sodium you eat to less than 2,300 milligrams (mg) a day. Your doctor may ask you to try to eat less than 1,500 mg a day. · Eat plenty of fruits (such as bananas and oranges), vegetables, legumes, whole grains, and low-fat dairy products. · Lower the amount of saturated fat in your diet. Saturated fat is found in animal products such as milk, cheese, and meat. Limiting these foods may help you lose weight and also lower your risk for heart disease. · Do not smoke. Smoking increases your risk for heart attack and stroke. If you need help quitting, talk to your doctor about stop-smoking programs and medicines. These can increase your chances of quitting for good. When should you call for help? Call 911 anytime you think you may need emergency care. This may mean having symptoms that suggest that your blood pressure is causing a serious heart or blood vessel problem. Your blood pressure may be over 180/110. ? For example, call 911 if:  ? · You have symptoms of a heart attack. These may include:  ¨ Chest pain or pressure, or a strange feeling in the chest.  ¨ Sweating. ¨ Shortness of breath. ¨ Nausea or vomiting.   ¨ Pain, pressure, or a strange feeling in the back, neck, jaw, or upper belly or in one or both shoulders or arms.  ¨ Lightheadedness or sudden weakness. ¨ A fast or irregular heartbeat. ? · You have symptoms of a stroke. These may include:  ¨ Sudden numbness, tingling, weakness, or loss of movement in your face, arm, or leg, especially on only one side of your body. ¨ Sudden vision changes. ¨ Sudden trouble speaking. ¨ Sudden confusion or trouble understanding simple statements. ¨ Sudden problems with walking or balance. ¨ A sudden, severe headache that is different from past headaches. ? · You have severe back or belly pain. ?Do not wait until your blood pressure comes down on its own. Get help right away. ?Call your doctor now or seek immediate care if:  ? · Your blood pressure is much higher than normal (such as 180/110 or higher), but you don't have symptoms. ? · You think high blood pressure is causing symptoms, such as:  ¨ Severe headache. ¨ Blurry vision. ? Watch closely for changes in your health, and be sure to contact your doctor if:  ? · Your blood pressure measures 140/90 or higher at least 2 times. That means the top number is 140 or higher or the bottom number is 90 or higher, or both. ? · You think you may be having side effects from your blood pressure medicine. ? · Your blood pressure is usually normal, but it goes above normal at least 2 times. Where can you learn more? Go to http://merari-reva.info/. Enter U927 in the search box to learn more about \"High Blood Pressure: Care Instructions. \"  Current as of: September 21, 2016  Content Version: 11.4  © 2822-8348 [employer]. Care instructions adapted under license by [employer] (which disclaims liability or warranty for this information). If you have questions about a medical condition or this instruction, always ask your healthcare professional. [place] [address] any warranty or liability for your use of this information.          Chronic Pain: Care Instructions  Your Care Instructions    Chronic pain is pain that lasts a long time (months or even years) and may or may not have a clear cause. It is different from acute pain, which usually does have a clear cause-like an injury or illness-and gets better over time. Chronic pain:  · Lasts over time but may vary from day to day. · Does not go away despite efforts to end it. · May disrupt your sleep and lead to fatigue. · May cause depression or anxiety. · May make your muscles tense, causing more pain. · Can disrupt your work, hobbies, home life, and relationships with friends and family. Chronic pain is a very real condition. It is not just in your head. Treatment can help and usually includes several methods used together, such as medicines, physical therapy, exercise, and other treatments. Learning how to relax and changing negative thought patterns can also help you cope. Chronic pain is complex. Taking an active role in your treatment will help you better manage your pain. Tell your doctor if you have trouble dealing with your pain. You may have to try several things before you find what works best for you. Follow-up care is a key part of your treatment and safety. Be sure to make and go to all appointments, and call your doctor if you are having problems. It's also a good idea to know your test results and keep a list of the medicines you take. How can you care for yourself at home? · Pace yourself. Break up large jobs into smaller tasks. Save harder tasks for days when you have less pain, or go back and forth between hard tasks and easier ones. Take rest breaks. · Relax, and reduce stress. Relaxation techniques such as deep breathing or meditation can help. · Keep moving. Gentle, daily exercise can help reduce pain over the long run. Try low- or no-impact exercises such as walking, swimming, and stationary biking. Do stretches to stay flexible. · Try heat, cold packs, and massage. · Get enough sleep.  Chronic pain can make you tired and drain your energy. Talk with your doctor if you have trouble sleeping because of pain. · Think positive. Your thoughts can affect your pain level. Do things that you enjoy to distract yourself when you have pain instead of focusing on the pain. See a movie, read a book, listen to music, or spend time with a friend. · If you think you are depressed, talk to your doctor about treatment. · Keep a daily pain diary. Record how your moods, thoughts, sleep patterns, activities, and medicine affect your pain. You may find that your pain is worse during or after certain activities or when you are feeling a certain emotion. Having a record of your pain can help you and your doctor find the best ways to treat your pain. · Take pain medicines exactly as directed. ¨ If the doctor gave you a prescription medicine for pain, take it as prescribed. ¨ If you are not taking a prescription pain medicine, ask your doctor if you can take an over-the-counter medicine. Reducing constipation caused by pain medicine  · Include fruits, vegetables, beans, and whole grains in your diet each day. These foods are high in fiber. · Drink plenty of fluids, enough so that your urine is light yellow or clear like water. If you have kidney, heart, or liver disease and have to limit fluids, talk with your doctor before you increase the amount of fluids you drink. · If your doctor recommends it, get more exercise. Walking is a good choice. Bit by bit, increase the amount you walk every day. Try for at least 30 minutes on most days of the week. · Schedule time each day for a bowel movement. A daily routine may help. Take your time and do not strain when having a bowel movement. When should you call for help? Call your doctor now or seek immediate medical care if:  ? · Your pain gets worse or is out of control. ? · You feel down or blue, or you do not enjoy things like you once did.  You may be depressed, which is common in people with chronic pain. Depression can be treated. ? · You have vomiting or cramps for more than 2 hours. ? Watch closely for changes in your health, and be sure to contact your doctor if:  ? · You cannot sleep because of pain. ? · You are very worried or anxious about your pain. ? · You have trouble taking your pain medicine. ? · You have any concerns about your pain medicine. ? · You have trouble with bowel movements, such as:  ¨ No bowel movement in 3 days. ¨ Blood in the anal area, in your stool, or on the toilet paper. ¨ Diarrhea for more than 24 hours. Where can you learn more? Go to http://merari-reva.info/. Enter N004 in the search box to learn more about \"Chronic Pain: Care Instructions. \"  Current as of: October 14, 2016  Content Version: 11.4  © 3511-5748 [employer]. Care instructions adapted under license by [employer] (which disclaims liability or warranty for this information). If you have questions about a medical condition or this instruction, always ask your healthcare professional. [address] any warranty or liability for your use of this information.

## 2018-06-20 NOTE — MR AVS SNAPSHOT
[facility] 
 
 
 [address] 
[address] [place] [address] 
[phone] Patient: Zena Almeida MRN: [id number] MPI:[id number] Visit Information Date & Time Provider Department Dept. Phone Encounter #  
 6/20/2018 10:00 AM Catracho Frances NP [facility] [id number] [phone] [id number] [phone] Follow-up Instructions Return in about 1 week (around 6/27/2018) for Medication Check, Hypertension, Cellulitis. Your Appointments 6/27/2018  9:20 AM  
ROUTINE CARE with RHYS Gunn ([facility] [place]) Appt Note: medication check, hypertension, cellulitis [address] [facility] [address]  
[phone]  
  
   
 [address] Upcoming Health Maintenance Date Due  
 LIPID PANEL Q1 1975 FOOT EXAM Q1 2/24/1986 MICROALBUMIN Q1 2/24/1986 EYE EXAM RETINAL OR DILATED Q1 2/24/1986 Pneumococcal 19-64 Medium Risk (1 of 1 - PPSV23) 2/24/1995 DTaP/Tdap/Td series (1 - Tdap) 2/24/1997 Influenza Age 5 to Adult 8/1/2018 HEMOGLOBIN A1C Q6M 12/9/2018 Allergies as of 6/20/2018  Review Complete On: 6/20/2018 By: Claudeen Rocks, RN Severity Noted Reaction Type Reactions Lisinopril  06/08/2018    Photosensitivity, Vertigo Current Immunizations  Reviewed on 11/9/2013 Name Date Influenza Vaccine  Deferred (Patient Refused) Pneumococcal Polysaccharide (PPSV-23)  Deferred (Patient Refused) Not reviewed this visit You Were Diagnosed With   
  
 Codes Comments Malignant hypertension    -  Primary ICD-10-CM: I10 
ICD-9-CM: 401.0 Left leg cellulitis     ICD-10-CM: L03.116 ICD-9-CM: 355. 6 Type 2 diabetes with nephropathy (HCC)     ICD-10-CM: E11.21 
ICD-9-CM: 250.40, 583.81 Vitals BP Pulse Temp Resp Height(growth percentile) Weight(growth percentile) Loredo Pole ) 216/124 (BP 1 Location: Right arm, BP Patient Position: Sitting) 90 96.2 °F (35.7 °C) (Oral) 14 5' 9\" (1.753 m) 212 lb (96.2 kg) SpO2 BMI Smoking Status 97% 31.31 kg/m2 Current Every Day Smoker Vitals History BMI and BSA Data Body Mass Index Body Surface Area  
 31.31 kg/m 2 2.16 m 2 Preferred Pharmacy Pharmacy Name Phone [facility] [address] [address] Tiesha Damon [phone] Your Updated Medication List  
  
   
This list is accurate as of 6/20/18  6:02 PM.  Always use your most recent med list.  
  
  
  
  
 BENADRYL ALLERGY 25 mg tablet Generic drug:  diphenhydrAMINE Take 25 mg by mouth two (2) times a day. carvedilol 25 mg tablet Commonly known as:  [place]  Take 25 mg by mouth daily. furosemide 40 mg tablet Commonly known as:  LASIX Take 1 Tab by mouth two (2) times a day. metFORMIN 850 mg tablet Commonly known as:  GLUCOPHAGE Take 850 mg by mouth two (2) times daily (with meals). Follow-up Instructions Return in about 1 week (around 6/27/2018) for Medication Check, Hypertension, Cellulitis. Patient Instructions Cellulitis: Care Instructions Your Care Instructions Cellulitis is a skin infection. It often occurs after a break in the skin from a scrape, cut, bite, or puncture, or after a rash. The doctor has checked you carefully, but problems can develop later. If you notice any problems or new symptoms, get medical treatment right away. Follow-up care is a key part of your treatment and safety. Be sure to make and go to all appointments, and call your doctor if you are having problems. It's also a good idea to know your test results and keep a list of the medicines you take. How can you care for yourself at home? · Take your antibiotics as directed. Do not stop taking them just because you feel better. You need to take the full course of antibiotics. · Prop up the infected area on pillows to reduce pain and swelling. Try to keep the area above the level of your heart as often as you can. · If your doctor told you how to care for your wound, follow your doctor's instructions. If you did not get instructions, follow this general advice: ¨ Wash the wound with clean water 2 times a day. Don't use hydrogen peroxide or alcohol, which can slow healing. ¨ You may cover the wound with a thin layer of petroleum jelly, such as Vaseline, and a nonstick bandage. ¨ Apply more petroleum jelly and replace the bandage as needed. · Be safe with medicines. Take pain medicines exactly as directed. ¨ If the doctor gave you a prescription medicine for pain, take it as prescribed. ¨ If you are not taking a prescription pain medicine, ask your doctor if you can take an over-the-counter medicine. To prevent cellulitis in the future · Try to prevent cuts, scrapes, or other injuries to your skin. Cellulitis most often occurs where there is a break in the skin. · If you get a scrape, cut, mild burn, or bite, wash the wound with clean water as soon as you can to help avoid infection. Don't use hydrogen peroxide or alcohol, which can slow healing. · If you have swelling in your legs (edema), support stockings and good skin care may help prevent leg sores and cellulitis. · Take care of your feet, especially if you have diabetes or other conditions that increase the risk of infection. Wear shoes and socks. Do not go barefoot. If you have athlete's foot or other skin problems on your feet, talk to your doctor about how to treat them. When should you call for help? Call your doctor now or seek immediate medical care if: 
? · You have signs that your infection is getting worse, such as: 
¨ Increased pain, swelling, warmth, or redness. ¨ Red streaks leading from the area. ¨ Pus draining from the area. ¨ A fever. ? · You get a rash. ?Watch closely for changes in your health, and be sure to contact your doctor if: 
? · You are not getting better after 1 day (24 hours). ? · You do not get better as expected. Where can you learn more? Go to http://merari-reva.info/. Lamarbri Satishnicho in the search box to learn more about \"Cellulitis: Care Instructions. \" Current as of: October 13, 2016 Content Version: 11.4 © 6338-3919 [employer]. Care instructions adapted under license by [employer] (which disclaims liability or warranty for this information). If you have questions about a medical condition or this instruction, always ask your healthcare professional. [address] any warranty or liability for your use of this information. High Blood Pressure: Care Instructions Your Care Instructions If your blood pressure is usually above 140/90, you have high blood pressure, or hypertension. That means the top number is 140 or higher or the bottom number is 90 or higher, or both. Despite what a lot of people think, high blood pressure usually doesn't cause headaches or make you feel dizzy or lightheaded. It usually has no symptoms. But it does increase your risk for heart attack, stroke, and kidney or eye damage. The higher your blood pressure, the more your risk increases. Your doctor will give you a goal for your blood pressure. Your goal will be based on your health and your age. An example of a goal is to keep your blood pressure below 140/90. Lifestyle changes, such as eating healthy and being active, are always important to help lower blood pressure. You might also take medicine to reach your blood pressure goal. 
Follow-up care is a key part of your treatment and safety. Be sure to make and go to all appointments, and call your doctor if you are having problems. It's also a good idea to know your test results and keep a list of the medicines you take. How can you care for yourself at home? Medical treatment · If you stop taking your medicine, your blood pressure will go back up. You may take one or more types of medicine to lower your blood pressure. Be safe with medicines. Take your medicine exactly as prescribed. Call your doctor if you think you are having a problem with your medicine. · Talk to your doctor before you start taking aspirin every day. Aspirin can help certain people lower their risk of a heart attack or stroke. But taking aspirin isn't right for everyone, because it can cause serious bleeding. · See your doctor regularly. You may need to see the doctor more often at first or until your blood pressure comes down. · If you are taking blood pressure medicine, talk to your doctor before you take decongestants or anti-inflammatory medicine, such as ibuprofen. Some of these medicines can raise blood pressure. · Learn how to check your blood pressure at home. Lifestyle changes · Stay at a healthy weight. This is especially important if you put on weight around the waist. Losing even 10 pounds can help you lower your blood pressure. · If your doctor recommends it, get more exercise. Walking is a good choice. Bit by bit, increase the amount you walk every day. Try for at least 30 minutes on most days of the week. You also may want to swim, bike, or do other activities. · Avoid or limit alcohol. Talk to your doctor about whether you can drink any alcohol. · Try to limit how much sodium you eat to less than 2,300 milligrams (mg) a day. Your doctor may ask you to try to eat less than 1,500 mg a day. · Eat plenty of fruits (such as bananas and oranges), vegetables, legumes, whole grains, and low-fat dairy products. · Lower the amount of saturated fat in your diet. Saturated fat is found in animal products such as milk, cheese, and meat. Limiting these foods may help you lose weight and also lower your risk for heart disease. · Do not smoke. Smoking increases your risk for heart attack and stroke. If you need help quitting, talk to your doctor about stop-smoking programs and medicines. These can increase your chances of quitting for good. When should you call for help? Call 911 anytime you think you may need emergency care. This may mean having symptoms that suggest that your blood pressure is causing a serious heart or blood vessel problem. Your blood pressure may be over 180/110. ? For example, call 911 if: 
? · You have symptoms of a heart attack. These may include: ¨ Chest pain or pressure, or a strange feeling in the chest. 
¨ Sweating. ¨ Shortness of breath. ¨ Nausea or vomiting. ¨ Pain, pressure, or a strange feeling in the back, neck, jaw, or upper belly or in one or both shoulders or arms. ¨ Lightheadedness or sudden weakness. ¨ A fast or irregular heartbeat. ? · You have symptoms of a stroke. These may include: 
¨ Sudden numbness, tingling, weakness, or loss of movement in your face, arm, or leg, especially on only one side of your body. ¨ Sudden vision changes. ¨ Sudden trouble speaking. ¨ Sudden confusion or trouble understanding simple statements. ¨ Sudden problems with walking or balance. ¨ A sudden, severe headache that is different from past headaches. ? · You have severe back or belly pain. ?Do not wait until your blood pressure comes down on its own. Get help right away. ?Call your doctor now or seek immediate care if: 
? · Your blood pressure is much higher than normal (such as 180/110 or higher), but you don't have symptoms. ? · You think high blood pressure is causing symptoms, such as: ¨ Severe headache. ¨ Blurry vision. ? Watch closely for changes in your health, and be sure to contact your doctor if: 
? · Your blood pressure measures 140/90 or higher at least 2 times. That means the top number is 140 or higher or the bottom number is 90 or higher, or both. ? · You think you may be having side effects from your blood pressure medicine. ? · Your blood pressure is usually normal, but it goes above normal at least 2 times. Where can you learn more? Go to http://merari-reva.info/. Enter U888 in the search box to learn more about \"High Blood Pressure: Care Instructions. \" Current as of: September 21, 2016 Content Version: 11.4 © 9125-3384 [employer]. Care instructions adapted under license by [employer] (which disclaims liability or warranty for this information). If you have questions about a medical condition or this instruction, always ask your healthcare professional. [address] any warranty or liability for your use of this information. Introducing hospitals & HEALTH SERVICES! Diane Marinelli introduces [employer] patient portal. Now you can access parts of your medical record, email your doctor's office, and request medication refills online. 1. In your internet browser, go to [email or web address] 2. Click on the First Time User? Click Here link in the Sign In box. You will see the New Member Sign Up page. 3. Enter your [employer] Access Code exactly as it appears below. You will not need to use this code after youve completed the sign-up process. If you do not sign up before the expiration date, you must request a new code. · [employer] Access Code: [email or web address] Expires: 9/6/2018  8:31 AM 
 
4. Enter the last four digits of your Social Security Number (xxxx) and Date of Birth (mm/dd/yyyy) as indicated and click Submit. You will be taken to the next sign-up page. 5. Create a [employer] ID. This will be your [employer] login ID and cannot be changed, so think of one that is secure and easy to remember. 6. Create a [employer] password. You can change your password at any time. 7. Enter your Password Reset Question and Answer.  This can be used at a later time if you forget your password. 8. Enter your e-mail address. You will receive e-mail notification when new information is available in [address] 9. Click Sign Up. You can now view and download portions of your medical record. 10. Click the Download Summary menu link to download a portable copy of your medical information. If you have questions, please visit the Frequently Asked Questions section of the [email or web address] website. Remember, [email or web address] is NOT to be used for urgent needs. For medical emergencies, dial 911. Now available from your iPhone and Android! Please provide this summary of care documentation to your next provider. Your primary care clinician is listed as Hong Funez. [employer]. If you have any questions after today's visit, please call [phone].

## 2018-06-25 LAB
BACTERIA SPEC CULT: NORMAL
SERVICE CMNT-IMP: NORMAL

## 2018-06-27 ENCOUNTER — OFFICE VISIT (OUTPATIENT)
Dept: FAMILY MEDICINE CLINIC | Age: 43
End: 2018-06-27

## 2018-06-27 VITALS
HEART RATE: 82 BPM | DIASTOLIC BLOOD PRESSURE: 110 MMHG | RESPIRATION RATE: 12 BRPM | HEIGHT: 69 IN | WEIGHT: 211.2 LBS | BODY MASS INDEX: 31.28 KG/M2 | SYSTOLIC BLOOD PRESSURE: 200 MMHG | OXYGEN SATURATION: 99 % | TEMPERATURE: 98.6 F

## 2018-06-27 DIAGNOSIS — B18.2 CHRONIC HEPATITIS C WITHOUT HEPATIC COMA (HCC): ICD-10-CM

## 2018-06-27 DIAGNOSIS — M79.662 PAIN IN LEFT LOWER LEG: ICD-10-CM

## 2018-06-27 DIAGNOSIS — E11.21 TYPE 2 DIABETES MELLITUS WITH DIABETIC NEPHROPATHY, WITHOUT LONG-TERM CURRENT USE OF INSULIN (HCC): ICD-10-CM

## 2018-06-27 DIAGNOSIS — N18.30 STAGE 3 CHRONIC KIDNEY DISEASE (HCC): ICD-10-CM

## 2018-06-27 DIAGNOSIS — F10.10 ALCOHOL ABUSE, DAILY USE: ICD-10-CM

## 2018-06-27 DIAGNOSIS — L03.116 CELLULITIS OF LEFT LOWER LEG: Primary | ICD-10-CM

## 2018-06-27 DIAGNOSIS — I10 ACCELERATED HYPERTENSION: ICD-10-CM

## 2018-06-27 DIAGNOSIS — Z72.0 TOBACCO ABUSE: ICD-10-CM

## 2018-06-27 RX ORDER — AMLODIPINE BESYLATE 10 MG/1
10 TABLET ORAL DAILY
Qty: 30 TAB | Refills: 0 | Status: SHIPPED | OUTPATIENT
Start: 2018-06-27 | End: 2019-06-10 | Stop reason: SDUPTHER

## 2018-06-27 NOTE — MR AVS SNAPSHOT
[address] [place] [address] 
 
 
 [id number] [place], [place] [address] 
[phone] Patient: Nahed Melchor MRN: [id number] DFA:[birth year] Visit Information Date & Time Provider Department Dept. Phone Encounter #  
 6/27/2018  9:20 AM Annmarie Dobbs NP [facility] [phone] [id number] Follow-up Instructions Return in about 1 week (around 7/4/2018) for Medication Check, Hypertension, Cellulitis. Upcoming Health Maintenance Date Due MICROALBUMIN Q1 7/4/2018* LIPID PANEL Q1 7/4/2018* Pneumococcal 19-64 Medium Risk (1 of 1 - PPSV23) 7/18/2018* DTaP/Tdap/Td series (1 - Tdap) 7/19/2018* EYE EXAM RETINAL OR DILATED Q1 7/19/2018* FOOT EXAM Q1 9/14/2018* Influenza Age 5 to Adult 8/1/2018 HEMOGLOBIN A1C Q6M 12/9/2018 *Topic was postponed. The date shown is not the original due date. Allergies as of 6/27/2018  Review Complete On: 6/27/2018 By: Annmarie Dobbs NP Severity Noted Reaction Type Reactions Lisinopril  06/08/2018    Photosensitivity, Vertigo Current Immunizations  Reviewed on 11/9/2013 Name Date Influenza Vaccine  Deferred (Patient Refused) Pneumococcal Polysaccharide (PPSV-23)  Deferred (Patient Refused) Not reviewed this visit You Were Diagnosed With   
  
 Codes Comments Cellulitis of left lower leg    -  Primary ICD-10-CM: G78.442 ICD-9-CM: 682.6 Stage 3 chronic kidney disease     ICD-10-CM: N18.3 ICD-9-CM: 202. 3 Type 2 diabetes mellitus with diabetic nephropathy, without long-term current use of insulin (HCC)     ICD-10-CM: E11.21 
ICD-9-CM: 250.40, 583.81 Accelerated hypertension     ICD-10-CM: I10 
ICD-9-CM: 401.0 Chronic hepatitis C without hepatic coma (HCC)     ICD-10-CM: B18.2 ICD-9-CM: 070.54 Alcohol abuse, daily use     ICD-10-CM: F10.10 ICD-9-CM: 305.01 Tobacco abuse     ICD-10-CM: Z72.0 ICD-9-CM: 305.1 Pain in left lower leg     ICD-10-CM: P36.780 ICD-9-CM: 729.5 Vitals BP Pulse Temp Resp Height(growth percentile) Weight(growth percentile) (!) 200/110 (BP 1 Location: Left arm, BP Patient Position: Sitting) 82 98.6 °F (37 °C) (Oral) 12 5' 9\" (1.753 m) 211 lb 3.2 oz (95.8 kg) SpO2 BMI Smoking Status 99% 31.19 kg/m2 Current Every Day Smoker Vitals History BMI and BSA Data Body Mass Index Body Surface Area  
 31.19 kg/m 2 2.16 m 2 Preferred Pharmacy Pharmacy Name Phone [facility], [place] [address] [facility] [phone] Your Updated Medication List  
  
   
This list is accurate as of 6/27/18 10:31 AM.  Always use your most recent med list. amLODIPine 10 mg tablet Commonly known as:  Inocencio Rings Take 1 Tab by mouth daily. BENADRYL ALLERGY 25 mg tablet Generic drug:  diphenhydrAMINE Take 25 mg by mouth nightly as needed. carvedilol 25 mg tablet Commonly known as:  Carrolyn Peabody Take 25 mg by mouth two (2) times a day. furosemide 40 mg tablet Commonly known as:  LASIX Take 1 Tab by mouth two (2) times a day. metFORMIN 850 mg tablet Commonly known as:  GLUCOPHAGE Take 850 mg by mouth daily. Prescriptions Sent to Pharmacy Refills  
 amLODIPine (NORVASC) 10 mg tablet 0 Sig: Take 1 Tab by mouth daily. Class: Normal  
 Pharmacy: [address] [facility], [address] [place] [address] #: [phone] Route: Oral  
  
We Performed the Following REFERRAL TO NEPHROLOGY [VIB46 Custom] REFERRAL TO PODIATRY [REF90 Custom] Follow-up Instructions Return in about 1 week (around 7/4/2018) for Medication Check, Hypertension, Cellulitis. Referral Information Referral ID Referred By Referred To  
  
 [id number] Fabiola Hernández MD   
   [address] [place] [address] Visits Status Start Date End Date 1 New Request 6/27/18 6/27/19 If your referral has a status of pending review or denied, additional information will be sent to support the outcome of this decision. Referral ID Referred By Referred To  
 [id number] [facility] [address] Visits Status Start Date End Date 1 New Request 6/27/18 6/27/19 If your referral has a status of pending review or denied, additional information will be sent to support the outcome of this decision. Patient Instructions Cellulitis: Care Instructions Your Care Instructions Cellulitis is a skin infection. It often occurs after a break in the skin from a scrape, cut, bite, or puncture, or after a rash. The doctor has checked you carefully, but problems can develop later. If you notice any problems or new symptoms, get medical treatment right away. Follow-up care is a key part of your treatment and safety. Be sure to make and go to all appointments, and call your doctor if you are having problems. It's also a good idea to know your test results and keep a list of the medicines you take. How can you care for yourself at home? · Take your antibiotics as directed. Do not stop taking them just because you feel better. You need to take the full course of antibiotics. · Prop up the infected area on pillows to reduce pain and swelling. Try to keep the area above the level of your heart as often as you can. · If your doctor told you how to care for your wound, follow your doctor's instructions. If you did not get instructions, follow this general advice: ¨ Wash the wound with clean water 2 times a day. Don't use hydrogen peroxide or alcohol, which can slow healing. ¨ You may cover the wound with a thin layer of petroleum jelly, such as Vaseline, and a nonstick bandage. ¨ Apply more petroleum jelly and replace the bandage as needed. · Be safe with medicines. Take pain medicines exactly as directed. ¨ If the doctor gave you a prescription medicine for pain, take it as prescribed. ¨ If you are not taking a prescription pain medicine, ask your doctor if you can take an over-the-counter medicine. To prevent cellulitis in the future · Try to prevent cuts, scrapes, or other injuries to your skin. Cellulitis most often occurs where there is a break in the skin. · If you get a scrape, cut, mild burn, or bite, wash the wound with clean water as soon as you can to help avoid infection. Don't use hydrogen peroxide or alcohol, which can slow healing. · If you have swelling in your legs (edema), support stockings and good skin care may help prevent leg sores and cellulitis. · Take care of your feet, especially if you have diabetes or other conditions that increase the risk of infection. Wear shoes and socks. Do not go barefoot. If you have athlete's foot or other skin problems on your feet, talk to your doctor about how to treat them. When should you call for help? Call your doctor now or seek immediate medical care if: 
? · You have signs that your infection is getting worse, such as: 
¨ Increased pain, swelling, warmth, or redness. ¨ Red streaks leading from the area. ¨ Pus draining from the area. ¨ A fever. ? · You get a rash. ? Watch closely for changes in your health, and be sure to contact your doctor if: 
? · You are not getting better after 1 day (24 hours). ? · You do not get better as expected. Where can you learn more? Go to http://merari-reva.info/. Mariza Davila in the search box to learn more about \"Cellulitis: Care Instructions. \" Current as of: October 13, 2016 Content Version: 11.4 © 8075-6890 [employer].  Care instructions adapted under license by [employer] (which disclaims liability or warranty for this information). If you have questions about a medical condition or this instruction, always ask your healthcare professional. [address] any warranty or liability for your use of this information. High Blood Pressure: Care Instructions Your Care Instructions If your blood pressure is usually above 140/90, you have high blood pressure, or hypertension. That means the top number is 140 or higher or the bottom number is 90 or higher, or both. Despite what a lot of people think, high blood pressure usually doesn't cause headaches or make you feel dizzy or lightheaded. It usually has no symptoms. But it does increase your risk for heart attack, stroke, and kidney or eye damage. The higher your blood pressure, the more your risk increases. Your doctor will give you a goal for your blood pressure. Your goal will be based on your health and your age. An example of a goal is to keep your blood pressure below 140/90. Lifestyle changes, such as eating healthy and being active, are always important to help lower blood pressure. You might also take medicine to reach your blood pressure goal. 
Follow-up care is a key part of your treatment and safety. Be sure to make and go to all appointments, and call your doctor if you are having problems. It's also a good idea to know your test results and keep a list of the medicines you take. How can you care for yourself at home? Medical treatment · If you stop taking your medicine, your blood pressure will go back up. You may take one or more types of medicine to lower your blood pressure. Be safe with medicines. Take your medicine exactly as prescribed. Call your doctor if you think you are having a problem with your medicine. · Talk to your doctor before you start taking aspirin every day. Aspirin can help certain people lower their risk of a heart attack or stroke.  But taking aspirin isn't right for everyone, because it can cause serious bleeding. · See your doctor regularly. You may need to see the doctor more often at first or until your blood pressure comes down. · If you are taking blood pressure medicine, talk to your doctor before you take decongestants or anti-inflammatory medicine, such as ibuprofen. Some of these medicines can raise blood pressure. · Learn how to check your blood pressure at home. Lifestyle changes · Stay at a healthy weight. This is especially important if you put on weight around the waist. Losing even 10 pounds can help you lower your blood pressure. · If your doctor recommends it, get more exercise. Walking is a good choice. Bit by bit, increase the amount you walk every day. Try for at least 30 minutes on most days of the week. You also may want to swim, bike, or do other activities. · Avoid or limit alcohol. Talk to your doctor about whether you can drink any alcohol. · Try to limit how much sodium you eat to less than 2,300 milligrams (mg) a day. Your doctor may ask you to try to eat less than 1,500 mg a day. · Eat plenty of fruits (such as bananas and oranges), vegetables, legumes, whole grains, and low-fat dairy products. · Lower the amount of saturated fat in your diet. Saturated fat is found in animal products such as milk, cheese, and meat. Limiting these foods may help you lose weight and also lower your risk for heart disease. · Do not smoke. Smoking increases your risk for heart attack and stroke. If you need help quitting, talk to your doctor about stop-smoking programs and medicines. These can increase your chances of quitting for good. When should you call for help? Call 911 anytime you think you may need emergency care. This may mean having symptoms that suggest that your blood pressure is causing a serious heart or blood vessel problem. Your blood pressure may be over 180/110. ? For example, call 911 if: 
? · You have symptoms of a heart attack. These may include: ¨ Chest pain or pressure, or a strange feeling in the chest. 
¨ Sweating. ¨ Shortness of breath. ¨ Nausea or vomiting. ¨ Pain, pressure, or a strange feeling in the back, neck, jaw, or upper belly or in one or both shoulders or arms. ¨ Lightheadedness or sudden weakness. ¨ A fast or irregular heartbeat. ? · You have symptoms of a stroke. These may include: 
¨ Sudden numbness, tingling, weakness, or loss of movement in your face, arm, or leg, especially on only one side of your body. ¨ Sudden vision changes. ¨ Sudden trouble speaking. ¨ Sudden confusion or trouble understanding simple statements. ¨ Sudden problems with walking or balance. ¨ A sudden, severe headache that is different from past headaches. ? · You have severe back or belly pain. ?Do not wait until your blood pressure comes down on its own. Get help right away. ?Call your doctor now or seek immediate care if: 
? · Your blood pressure is much higher than normal (such as 180/110 or higher), but you don't have symptoms. ? · You think high blood pressure is causing symptoms, such as: ¨ Severe headache. ¨ Blurry vision. ? Watch closely for changes in your health, and be sure to contact your doctor if: 
? · Your blood pressure measures 140/90 or higher at least 2 times. That means the top number is 140 or higher or the bottom number is 90 or higher, or both. ? · You think you may be having side effects from your blood pressure medicine. ? · Your blood pressure is usually normal, but it goes above normal at least 2 times. Where can you learn more? Go to http://merari-reva.info/. Enter P786 in the search box to learn more about \"High Blood Pressure: Care Instructions. \" Current as of: September 21, 2016 Content Version: 11.4 © 0684-2695 [employer].  Care instructions adapted under license by [employer] (which disclaims liability or warranty for this information). If you have questions about a medical condition or this instruction, always ask your healthcare professional. [address] any warranty or liability for your use of this information. Introducing [facility] SERVICES! [facility] introduces Restaurant.com patient portal. Now you can access parts of your medical record, email your doctor's office, and request medication refills online. 1. In your internet browser, go to [email or web address] 2. Click on the First Time User? Click Here link in the Sign In box. You will see the New Member Sign Up page. 3. Enter your Restaurant[employer]com Access Code exactly as it appears below. You will not need to use this code after youve completed the sign-up process. If you do not sign up before the expiration date, you must request a new code. · Restaurant.com Access Code: [email or web address] Expires: 9/6/2018  8:31 AM 
 
4. Enter the last four digits of your Social Security Number (xxxx) and Date of Birth (mm/dd/yyyy) as indicated and click Submit. You will be taken to the next sign-up page. 5. Create a Restaurant.com ID. This will be your Restaurant.com login ID and cannot be changed, so think of one that is secure and easy to remember. 6. Create a Restaurant.com password. You can change your password at any time. 7. Enter your Password Reset Question and Answer. This can be used at a later time if you forget your password. 8. Enter your e-mail address. You will receive e-mail notification when new information is available in [address] 9. Click Sign Up. You can now view and download portions of your medical record. 10. Click the Download Summary menu link to download a portable copy of your medical information. If you have questions, please visit the Frequently Asked Questions section of the Restaurant[employer] website. Remember, Restaurant.com is NOT to be used for urgent needs. For medical emergencies, dial 911. Now available from your iPhone and Android! Please provide this summary of care documentation to your next provider. Your primary care clinician is listed as Daniel Lentz. Autumn Durán. If you have any questions after today's visit, please call [phone].

## 2018-06-27 NOTE — PATIENT INSTRUCTIONS
Cellulitis: Care Instructions  Your Care Instructions    Cellulitis is a skin infection. It often occurs after a break in the skin from a scrape, cut, bite, or puncture, or after a rash. The doctor has checked you carefully, but problems can develop later. If you notice any problems or new symptoms, get medical treatment right away. Follow-up care is a key part of your treatment and safety. Be sure to make and go to all appointments, and call your doctor if you are having problems. It's also a good idea to know your test results and keep a list of the medicines you take. How can you care for yourself at home? · Take your antibiotics as directed. Do not stop taking them just because you feel better. You need to take the full course of antibiotics. · Prop up the infected area on pillows to reduce pain and swelling. Try to keep the area above the level of your heart as often as you can. · If your doctor told you how to care for your wound, follow your doctor's instructions. If you did not get instructions, follow this general advice:  ¨ Wash the wound with clean water 2 times a day. Don't use hydrogen peroxide or alcohol, which can slow healing. ¨ You may cover the wound with a thin layer of petroleum jelly, such as Vaseline, and a nonstick bandage. ¨ Apply more petroleum jelly and replace the bandage as needed. · Be safe with medicines. Take pain medicines exactly as directed. ¨ If the doctor gave you a prescription medicine for pain, take it as prescribed. ¨ If you are not taking a prescription pain medicine, ask your doctor if you can take an over-the-counter medicine. To prevent cellulitis in the future  · Try to prevent cuts, scrapes, or other injuries to your skin. Cellulitis most often occurs where there is a break in the skin. · If you get a scrape, cut, mild burn, or bite, wash the wound with clean water as soon as you can to help avoid infection.  Don't use hydrogen peroxide or alcohol, which can slow healing. · If you have swelling in your legs (edema), support stockings and good skin care may help prevent leg sores and cellulitis. · Take care of your feet, especially if you have diabetes or other conditions that increase the risk of infection. Wear shoes and socks. Do not go barefoot. If you have athlete's foot or other skin problems on your feet, talk to your doctor about how to treat them. When should you call for help? Call your doctor now or seek immediate medical care if:  ? · You have signs that your infection is getting worse, such as:  ¨ Increased pain, swelling, warmth, or redness. ¨ Red streaks leading from the area. ¨ Pus draining from the area. ¨ A fever. ? · You get a rash. ? Watch closely for changes in your health, and be sure to contact your doctor if:  ? · You are not getting better after 1 day (24 hours). ? · You do not get better as expected. Where can you learn more? Go to http://merari-reva.info/. Mina Bourne in the search box to learn more about \"Cellulitis: Care Instructions. \"  Current as of: October 13, 2016  Content Version: 11.4  © 9284-9873 [employer] Care instructions adapted under license by [employer] (which disclaims liability or warranty for this information). If you have questions about a medical condition or this instruction, always ask your healthcare professional. [place] [address] any warranty or liability for your use of this information. High Blood Pressure: Care Instructions  Your Care Instructions    If your blood pressure is usually above 140/90, you have high blood pressure, or hypertension. That means the top number is 140 or higher or the bottom number is 90 or higher, or both. Despite what a lot of people think, high blood pressure usually doesn't cause headaches or make you feel dizzy or lightheaded. It usually has no symptoms.  But it does increase your risk for heart attack, stroke, and kidney or eye damage. The higher your blood pressure, the more your risk increases. Your doctor will give you a goal for your blood pressure. Your goal will be based on your health and your age. An example of a goal is to keep your blood pressure below 140/90. Lifestyle changes, such as eating healthy and being active, are always important to help lower blood pressure. You might also take medicine to reach your blood pressure goal.  Follow-up care is a key part of your treatment and safety. Be sure to make and go to all appointments, and call your doctor if you are having problems. It's also a good idea to know your test results and keep a list of the medicines you take. How can you care for yourself at home? Medical treatment  · If you stop taking your medicine, your blood pressure will go back up. You may take one or more types of medicine to lower your blood pressure. Be safe with medicines. Take your medicine exactly as prescribed. Call your doctor if you think you are having a problem with your medicine. · Talk to your doctor before you start taking aspirin every day. Aspirin can help certain people lower their risk of a heart attack or stroke. But taking aspirin isn't right for everyone, because it can cause serious bleeding. · See your doctor regularly. You may need to see the doctor more often at first or until your blood pressure comes down. · If you are taking blood pressure medicine, talk to your doctor before you take decongestants or anti-inflammatory medicine, such as ibuprofen. Some of these medicines can raise blood pressure. · Learn how to check your blood pressure at home. Lifestyle changes  · Stay at a healthy weight. This is especially important if you put on weight around the waist. Losing even 10 pounds can help you lower your blood pressure. · If your doctor recommends it, get more exercise. Walking is a good choice.  Bit by bit, increase the amount you walk every day. Try for at least 30 minutes on most days of the week. You also may want to swim, bike, or do other activities. · Avoid or limit alcohol. Talk to your doctor about whether you can drink any alcohol. · Try to limit how much sodium you eat to less than 2,300 milligrams (mg) a day. Your doctor may ask you to try to eat less than 1,500 mg a day. · Eat plenty of fruits (such as bananas and oranges), vegetables, legumes, whole grains, and low-fat dairy products. · Lower the amount of saturated fat in your diet. Saturated fat is found in animal products such as milk, cheese, and meat. Limiting these foods may help you lose weight and also lower your risk for heart disease. · Do not smoke. Smoking increases your risk for heart attack and stroke. If you need help quitting, talk to your doctor about stop-smoking programs and medicines. These can increase your chances of quitting for good. When should you call for help? Call 911 anytime you think you may need emergency care. This may mean having symptoms that suggest that your blood pressure is causing a serious heart or blood vessel problem. Your blood pressure may be over 180/110. ? For example, call 911 if:  ? · You have symptoms of a heart attack. These may include:  ¨ Chest pain or pressure, or a strange feeling in the chest.  ¨ Sweating. ¨ Shortness of breath. ¨ Nausea or vomiting. ¨ Pain, pressure, or a strange feeling in the back, neck, jaw, or upper belly or in one or both shoulders or arms. ¨ Lightheadedness or sudden weakness. ¨ A fast or irregular heartbeat. ? · You have symptoms of a stroke. These may include:  ¨ Sudden numbness, tingling, weakness, or loss of movement in your face, arm, or leg, especially on only one side of your body. ¨ Sudden vision changes. ¨ Sudden trouble speaking. ¨ Sudden confusion or trouble understanding simple statements. ¨ Sudden problems with walking or balance.   ¨ A sudden, severe headache that is different from past headaches. ? · You have severe back or belly pain. ?Do not wait until your blood pressure comes down on its own. Get help right away. ?Call your doctor now or seek immediate care if:  ? · Your blood pressure is much higher than normal (such as 180/110 or higher), but you don't have symptoms. ? · You think high blood pressure is causing symptoms, such as:  ¨ Severe headache. ¨ Blurry vision. ? Watch closely for changes in your health, and be sure to contact your doctor if:  ? · Your blood pressure measures 140/90 or higher at least 2 times. That means the top number is 140 or higher or the bottom number is 90 or higher, or both. ? · You think you may be having side effects from your blood pressure medicine. ? · Your blood pressure is usually normal, but it goes above normal at least 2 times. Where can you learn more? Go to http://merari-reva.info/. Enter C525 in the search box to learn more about \"High Blood Pressure: Care Instructions. \"  Current as of: September 21, 2016  Content Version: 11.4  © 1069-8812 [employer]. Care instructions adapted under license by [employer] (which disclaims liability or warranty for this information). If you have questions about a medical condition or this instruction, always ask your healthcare professional. [place] [address] any warranty or liability for your use of this information.

## 2018-06-27 NOTE — PROGRESS NOTES
Chief Complaint   Patient presents with    Hypertension    Skin Infection     Patient states he has cellulitis on left foot, was sent to ED and they did nothing about it. HPI:  The patient is a 44 y.o. male who presents today for a follow up appointment. No hospital, ER or specialist visits since last primary care visit except as noted below. Left leg cellulitis  The patient presents today in f/u s/p initial visit on 6/20/2018 s/p ER hospitalization for malignant HTN and left leg/foot cellulitis, charcot foot, CKD II/III, diabetes, Hep C, hx ETOH, smoker. He was discharged on Augmentin which he completed. He also underwent dopplers of his LEs which were negative. His MRI of left foot showed Charcot changes of midfoot. He was advised to proceed to the ER at his visit on 6/20/2018 for HTN, pain in left leg/increased edema. ER records reviewed today indicate he was seen and treated. No change in medication. Left lower leg X-ray showed no change from his recent hospital admission. He was referred to podiatry, Leroy Wilson, for f/u but has been unable to reach them. 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 Patient Active Problem List   Diagnosis Code    Accelerated hypertension I10    Hyponatremia E87.1    Abnormal LFTs R94.5    Hepatitis C B19.20    Alcohol abuse, daily use F10.10    Foot fracture, left S92.902A    Type 2 diabetes with nephropathy (HCC) E11.21    Chronic kidney disease N18.9    Tobacco abuse Z72.0    Cellulitis of left lower leg L03. 80    Hypertension I10    Liver disease K76.9    H/O ETOH abuse Z87.898    Obesity (BMI 30.0-34. 9) E66.9       Past Medical History:   Diagnosis Date    Cellulitis of left lower leg 11/2013    Chronic kidney disease     Stage 2/3 - does not see Nephrologist, hypertensive nephropathy    Diabetes ([facility] [address]     type 2    H/O ETOH abuse     Hepatitis C     Hypertension     Liver disease     Cirrohsis and hep C    Obesity (BMI 30.0-34. 9)     Pancreatitis     Tobacco abuse        Past Surgical History:   Procedure Laterality Date    HX APPENDECTOMY      HX ORTHOPAEDIC Right 2011    infected cyst removed from right leg in [address] History   Substance Use Topics    Smoking status: Current Every Day Smoker     Packs/day: 1.00    Smokeless tobacco: Never Used      Comment: tried nicotine patch previously    Alcohol use No      Comment: 80 oz beer 3x/week - quit 3 weeks ago       Family History   Problem Relation Age of Onset    Diabetes Mother      legally blind    Heart Disease Mother      CHF    Kidney Disease Mother      on dialysis    Diabetes Father     Stroke Sister     Other Sister      arianne acuña       Outpatient Prescriptions Marked as Taking for the 6/27/18 encounter (Office Visit) with Ca Rock, NP   Medication Sig Dispense Refill    amLODIPine (NORVASC) 10 mg tablet Take 1 Tab by mouth daily. 30 Tab 0    furosemide (LASIX) 40 mg tablet Take 1 Tab by mouth two (2) times a day. (Patient taking differently: Take 40 mg by mouth daily.) 30 Tab 0    carvedilol (COREG) 25 mg tablet Take 25 mg by mouth two (2) times a day.  metFORMIN (GLUCOPHAGE) 850 mg tablet Take 850 mg by mouth daily.  diphenhydrAMINE (BENADRYL ALLERGY) 25 mg tablet Take 25 mg by mouth nightly as needed.          Allergies   Allergen Reactions    Lisinopril Photosensitivity and Vertigo       PE:  Visit Vitals    BP (!) 200/110 (BP 1 Location: Left arm, BP Patient Position: Sitting)    Pulse 82    Temp 98.6 °F (37 °C) (Oral)    Resp 12    Ht 5' 9\" (1.753 m)    Wt 211 lb 3.2 oz (95.8 kg)    SpO2 99%    BMI 31.19 kg/m2     Gen: alert, oriented, no acute distress  Head: normocephalic, atraumatic  Ears: external auditory canals clear, TMs without erythema or effusion  Eyes: pupils equal round reactive to light, sclera clear, conjunctiva clear  Oral: moist mucus membranes, no oral lesions, no pharyngeal inflammation or exudate  Neck: symmetric normal sized thyroid, no carotid bruits, no jugular vein distention  Resp: no increase work of breathing, lungs clear to ausculation bilaterally, no wheezing, rales or rhonchi  CV: S1, S2 normal.  No murmurs, rubs, or gallops. Abd: soft, not tender, not distended. No hepatosplenomegaly. Normal bowel sounds. No hernias. No abdominal or renal bruits. Neuro: cranial nerves intact, normal strength and movement in all extremities, reflexes and sensation intact and symmetric. Skin: no lesion or rash  Extremities: no cyanosis or edema    Assessment/Plan:    ICD-10-CM ICD-9-CM    1. Cellulitis of left lower leg L03.116 682.6 REFERRAL TO PODIATRY   2. Stage 3 chronic kidney disease N18.3 585.3 REFERRAL TO NEPHROLOGY   3. Type 2 diabetes mellitus with diabetic nephropathy, without long-term current use of insulin (HCC) E11.21 250.40 REFERRAL TO NEPHROLOGY     583.81 amLODIPine (NORVASC) 10 mg tablet   4. Accelerated hypertension I10 401.0 amLODIPine (NORVASC) 10 mg tablet   5. Chronic hepatitis C without hepatic coma (HCC) B18.2 070.54    6. Alcohol abuse, daily use F10.10 305.01    7. Tobacco abuse Z72.0 305.1    8. Pain in left lower leg M79.662 729.5 REFERRAL TO PODIATRY     Follow-up Disposition:  Return in about 1 week (around 7/4/2018) for Medication Check, Hypertension, Cellulitis.   lab results and schedule of future lab studies reviewed with patient  reviewed diet, exercise and weight control  very strongly urged to quit smoking to reduce cardiovascular risk  reviewed medications and side effects in detail  specific diabetic recommendations: low cholesterol diet, weight control and daily exercise discussed, home glucose monitoring emphasized, all medications, side effects and compliance discussed carefully, foot care discussed and Podiatry visits discussed, annual eye examinations at Ophthalmology discussed, glycohemoglobin and other lab monitoring discussed, long term diabetic complications discussed, patient urged in the strongest terms to quit smoking and labs immediately prior to next visit  radiology results and schedule of future radiology studies reviewed with patient  Reviewed all ER documentation today and reviewed treatment plan with the patient. Health Maintenance reviewed - needs Tdap, Pneum vaccines, labs due, will await his care card approval until we proceed with these per patient request.    Recommended healthy diet low in carbohydrates, fats, sodium and cholesterol. Recommended regular cardiovascular exercise 3-6 times per week for 30-60 minutes daily. Chart is reviewed and updated today in the office. Records requested for other providers patient has seen and is currently seeing. Patient was offered a choice/choices in the treatment plan today. Patient expresses understanding of the plan and agrees with recommendations. Verbal and written instructions (see AVS) provided. See patient instructions for more. Patient expresses understanding of diagnosis and treatment plan.

## 2018-06-27 NOTE — PROGRESS NOTES
1. Have you been to the ER, urgent care clinic since your last visit? Hospitalized since your last visit? [id number] [place] ED for foot    2. Have you seen or consulted any other health care providers outside of the [address] [place] [address] since your last visit? Include any pap smears or colon screening. No     Chief Complaint   Patient presents with    Hypertension    Skin Infection     Patient states he has cellulitis on left foot, was sent to ED and they did nothing about it. Patient has not had recent eye exam.      Notified physician of elevated blood pressure. Will re-check before leaving the office. Patient states he did take his blood pressure medication already today.

## 2018-06-28 ENCOUNTER — TELEPHONE (OUTPATIENT)
Dept: FAMILY MEDICINE CLINIC | Age: 43
End: 2018-06-28

## 2018-06-28 NOTE — TELEPHONE ENCOUNTER
Patient advised he can find nephrologist through [employer] website find provider and if he finds one we can change referral.

## 2018-06-28 NOTE — TELEPHONE ENCOUNTER
----- Message from Mechelle Donnelly sent at 6/28/2018 12:05 PM EDT -----  Regarding: Dr. Rosalva Huertas   Pt would like to be referred to a different Nephrologist. Pt stated the Nephrologist that was originally referred no longer take the care card. Best contact [phone].

## 2019-06-09 NOTE — PROGRESS NOTES
FREDY Muniz is a 38y.o. year old male patient of Rico Villalobos NP who presents with c/o est care. Pt has history of has Accelerated hypertension, Hyponatremia, Abnormal LFTs, Hepatitis C, Alcohol abuse, daily use, Foot fracture, left, Type 2 diabetes with nephropathy (HCC), Chronic kidney disease, Tobacco abuse, Cellulitis of left lower leg, Hypertension, Liver disease, H/O ETOH abuse, and Obesity (BMI 30.0-34.9) on their problem list.. Pt here to est care. Previously following with Dr. Timmy Kelsey, last seen 6/2018. HTN- amlodipine 10mg, carvdeilol 25 BID 
DM 2 w/CKD- metformin 850mg daily, previously on NPH,  referred to Neprhology at last visit with PCP Liver Disease, hx ETOH abuse and Hep C- did not f/u with liver specialist 
 
 
 
Lifestyle Diet: 
Exercise: 
ETOH: 
Nicotine: Smokes 1 PPD x 20 years Contraception: 
 
 
Denies chest pain, chest pressure, or palpitations. Denies SOB, orthopnea, or PND. Denies lower extremity swelling. Denies HA, dizziness, or blurred vision. Denies N/V/D, constipation, heartburn, or abd pain. Denies BRBPR, melena, or blood in urine. Denies feeling down, anxious, or depressed. Denies difficulty sleeping. Patient Active Problem List  
Diagnosis Code  Accelerated hypertension I10  
 Hyponatremia E87.1  Abnormal LFTs R94.5  Hepatitis C B19.20  Alcohol abuse, daily use F10.10  Foot fracture, left S92.902A  Type 2 diabetes with nephropathy (HCC) E11.21  
 Chronic kidney disease N18.9  Tobacco abuse Z72.0  
 Cellulitis of left lower leg L03. [facility] Hypertension I10  Liver disease K76.9  
 H/O ETOH abuse Z87.898  Obesity (BMI 30.0-34. 9) E66.9 Past Medical History:  
Diagnosis Date  Cellulitis of left lower leg 11/2013  Chronic kidney disease Stage 2/3 - does not see Nephrologist, hypertensive nephropathy  Diabetes ([facility] [address] type 2  
 H/O ETOH abuse  Hepatitis C   
 Hypertension  Liver disease Cirrohsis and hep C  
 Obesity (BMI 30.0-34. 9)  Pancreatitis  Tobacco abuse Past Surgical History:  
Procedure Laterality Date  HX APPENDECTOMY  HX ORTHOPAEDIC Right 2011 infected cyst removed from right leg in [address] Social History Socioeconomic History  Marital status: SINGLE Spouse name: Not on file  Number of children: Not on file  Years of education: Not on file  Highest education level: Not on file Tobacco Use  Smoking status: Current Every Day Smoker Packs/day: 1.00  Smokeless tobacco: Never Used  Tobacco comment: tried nicotine patch previously Substance and Sexual Activity  Alcohol use: No  
  Alcohol/week: 3.0 oz Types: 6 Cans of beer per week Comment: 80 oz beer 3x/week - quit 3 weeks ago  Drug use: No  
  Comment: quit IV heroin 1997  Sexual activity: Never Family History Problem Relation Age of Onset  Diabetes Mother   
     legally blind  Heart Disease Mother CHF  Kidney Disease Mother   
     on dialysis  Diabetes Father  Stroke Sister  Other Sister   
     Dasha Michelle Allergies Allergen Reactions  Lisinopril Photosensitivity and Vertigo MEDICATIONS Current Outpatient Medications Medication Sig  
 amLODIPine (NORVASC) 10 mg tablet Take 1 Tab by mouth daily.  furosemide (LASIX) 40 mg tablet Take 1 Tab by mouth two (2) times a day. (Patient taking differently: Take 40 mg by mouth daily.)  carvedilol (COREG) 25 mg tablet Take 25 mg by mouth two (2) times a day.  metFORMIN (GLUCOPHAGE) 850 mg tablet Take 850 mg by mouth daily.  diphenhydrAMINE (BENADRYL ALLERGY) 25 mg tablet Take 25 mg by mouth nightly as needed. No current facility-administered medications for this visit. REVIEW OF SYSTEMS Per HPI There were no vitals taken for this visit. General: Well-developed, well-nourished. In no distress. A&O x 3. Head: Normocephalic, atraumatic. Eyes: Conjunctiva clear. Pupils equal, round, reactive to light. Extraocular movements intact. Ears/Nose: TM's and ear canals normal bilaterally. Nares normal. Septum midline. Normal nasal mucosa. No drainage or sinus tenderness. Mouth/Throat: Lips, mucosa, and tongue normal. Oropharynx benign. Neck: Supple, symmetrical, trachea midline, no lymphadenopathy, no carotid bruits, no JVD, thyroid: not enlarged, symmetric, no tenderness/mass/nodules. Lungs: Clear to auscultation bilaterally. No crackles or wheezes. No use of accessory muscles. Speaks in full sentences without SOB. Chest Wall: No tenderness or deformity. Heart: RRR, normal S1 and S2, no murmur, click, rub, or gallop. Back: Symmetric. ROM intact. No CVA tenderness. Abdomen: Soft, non-distended, bowel sounds normal. No tenderness. No masses. No hepatosplenomegaly. Loletta NunnBanner Skin: No rashes or lesions. Neurological: CN II-XII intact. Normal strength, sensation, and reflexes throughout. Neurovasc: No edema appreciated. Dorsalis pedis pulses are 2+ on the right side, and 2+ on the left side. Posterior tibial pulses are 2+ on the right side, and 2+ on the left side. Musculoskeletal: Gait normal. ROM normal at both knees and shoulders. Psychiatric: Normal mood and affect. Behavior is normal.  
 
 
No results found for any visits on 06/10/19. ASSESSMENT and PLAN 
{There are no diagnoses linked to this encounter. (Refresh or delete this SmartLink)} There are no Patient Instructions on file for this visit. Health Maintenance Due Topic Date Due  
 LIPID PANEL Q1  1976  Pneumococcal 0-64 years (1 of 1 - PPSV23) 02/24/1982  
 FOOT EXAM Q1  02/24/1986  MICROALBUMIN Q1  02/24/1986  
 EYE EXAM RETINAL OR DILATED  02/24/1986  
 DTaP/Tdap/Td series (1 - Tdap) 02/24/1997  
 HEMOGLOBIN A1C Q6M  12/09/2018 I have discussed the diagnosis with the patient and the intended plan as seen in the above orders. Patient is in agreement. The patient has received an after-visit summary and questions were answered concerning future plans. I have discussed medication side effects and warnings with the patient as well. Warning signs for the above conditions were discussed including when to call our office or go to the emergency room. The nurse provided the patient and/or family with advanced directive information if needed and encouraged the patient to provide a copy to the office when available.

## 2019-06-10 ENCOUNTER — OFFICE VISIT (OUTPATIENT)
Dept: INTERNAL MEDICINE CLINIC | Facility: CLINIC | Age: 44
End: 2019-06-10

## 2019-06-10 VITALS
RESPIRATION RATE: 18 BRPM | TEMPERATURE: 97.7 F | OXYGEN SATURATION: 98 % | DIASTOLIC BLOOD PRESSURE: 95 MMHG | HEART RATE: 84 BPM | WEIGHT: 184.6 LBS | HEIGHT: 69 IN | SYSTOLIC BLOOD PRESSURE: 160 MMHG | BODY MASS INDEX: 27.34 KG/M2

## 2019-06-10 DIAGNOSIS — E11.42 DIABETIC POLYNEUROPATHY ASSOCIATED WITH TYPE 2 DIABETES MELLITUS (HCC): ICD-10-CM

## 2019-06-10 DIAGNOSIS — D63.8 ANEMIA OF CHRONIC DISEASE: ICD-10-CM

## 2019-06-10 DIAGNOSIS — E11.59 TYPE 2 DIABETES MELLITUS WITH OTHER CIRCULATORY COMPLICATION, WITHOUT LONG-TERM CURRENT USE OF INSULIN (HCC): Primary | ICD-10-CM

## 2019-06-10 DIAGNOSIS — R60.9 CHRONIC EDEMA: ICD-10-CM

## 2019-06-10 DIAGNOSIS — B18.2 CHRONIC HEPATITIS C WITHOUT HEPATIC COMA (HCC): ICD-10-CM

## 2019-06-10 DIAGNOSIS — Z01.00 ROUTINE EYE EXAM: ICD-10-CM

## 2019-06-10 DIAGNOSIS — E11.22 CKD STAGE 3 DUE TO TYPE 2 DIABETES MELLITUS (HCC): ICD-10-CM

## 2019-06-10 DIAGNOSIS — N18.30 CKD STAGE 3 DUE TO TYPE 2 DIABETES MELLITUS (HCC): ICD-10-CM

## 2019-06-10 DIAGNOSIS — I10 ESSENTIAL HYPERTENSION: ICD-10-CM

## 2019-06-10 DIAGNOSIS — E66.9 OBESITY (BMI 30.0-34.9): ICD-10-CM

## 2019-06-10 DIAGNOSIS — F10.11 HISTORY OF ETOH ABUSE: ICD-10-CM

## 2019-06-10 DIAGNOSIS — Z72.0 TOBACCO ABUSE: ICD-10-CM

## 2019-06-10 DIAGNOSIS — E78.00 HYPERCHOLESTEREMIA: ICD-10-CM

## 2019-06-10 DIAGNOSIS — M54.50 ACUTE MIDLINE LOW BACK PAIN WITHOUT SCIATICA: ICD-10-CM

## 2019-06-10 DIAGNOSIS — Z89.512 HX OF BKA, LEFT (HCC): ICD-10-CM

## 2019-06-10 DIAGNOSIS — K21.9 GASTROESOPHAGEAL REFLUX DISEASE, ESOPHAGITIS PRESENCE NOT SPECIFIED: ICD-10-CM

## 2019-06-10 RX ORDER — LOVASTATIN 40 MG/1
40 TABLET ORAL
COMMUNITY
End: 2019-06-10 | Stop reason: SDUPTHER

## 2019-06-10 RX ORDER — LOVASTATIN 40 MG/1
40 TABLET ORAL
Qty: 90 TAB | Refills: 5 | Status: SHIPPED | OUTPATIENT
Start: 2019-06-10

## 2019-06-10 RX ORDER — GABAPENTIN 300 MG/1
300 CAPSULE ORAL
COMMUNITY
End: 2019-06-10 | Stop reason: DRUGHIGH

## 2019-06-10 RX ORDER — HYDRALAZINE HYDROCHLORIDE 100 MG/1
100 TABLET, FILM COATED ORAL 3 TIMES DAILY
Qty: 90 TAB | Refills: 5 | Status: SHIPPED | OUTPATIENT
Start: 2019-06-10

## 2019-06-10 RX ORDER — CARVEDILOL 25 MG/1
25 TABLET ORAL 2 TIMES DAILY
Qty: 90 TAB | Refills: 5 | Status: SHIPPED | OUTPATIENT
Start: 2019-06-10

## 2019-06-10 RX ORDER — BUMETANIDE 2 MG/1
2 TABLET ORAL
COMMUNITY
End: 2019-06-10 | Stop reason: SDUPTHER

## 2019-06-10 RX ORDER — BUMETANIDE 2 MG/1
2 TABLET ORAL DAILY
Qty: 90 TAB | Refills: 5 | Status: SHIPPED | OUTPATIENT
Start: 2019-06-10

## 2019-06-10 RX ORDER — CLONIDINE HYDROCHLORIDE 0.3 MG/1
0.3 TABLET ORAL
COMMUNITY

## 2019-06-10 RX ORDER — AMLODIPINE BESYLATE 10 MG/1
10 TABLET ORAL DAILY
Qty: 90 TAB | Refills: 5 | Status: SHIPPED | OUTPATIENT
Start: 2019-06-10

## 2019-06-10 RX ORDER — PANTOPRAZOLE SODIUM 40 MG/1
40 TABLET, DELAYED RELEASE ORAL DAILY
Qty: 90 TAB | Refills: 5 | Status: SHIPPED | OUTPATIENT
Start: 2019-06-10

## 2019-06-10 RX ORDER — CLONIDINE HYDROCHLORIDE 0.3 MG/1
0.3 TABLET ORAL 3 TIMES DAILY
Qty: 90 TAB | Refills: 5 | Status: CANCELLED | OUTPATIENT
Start: 2019-06-10

## 2019-06-10 RX ORDER — GABAPENTIN 300 MG/1
300 CAPSULE ORAL DAILY
Qty: 90 CAP | Refills: 5 | Status: CANCELLED | OUTPATIENT
Start: 2019-06-10

## 2019-06-10 RX ORDER — GLIPIZIDE 2.5 MG/1
TABLET, EXTENDED RELEASE ORAL DAILY
COMMUNITY
End: 2019-06-10 | Stop reason: SDUPTHER

## 2019-06-10 RX ORDER — CETIRIZINE HCL 10 MG
TABLET ORAL
COMMUNITY
End: 2019-06-10 | Stop reason: SDUPTHER

## 2019-06-10 RX ORDER — GABAPENTIN 800 MG/1
800 TABLET ORAL 3 TIMES DAILY
Qty: 90 TAB | Refills: 1 | Status: SHIPPED | OUTPATIENT
Start: 2019-06-10 | End: 2019-07-20 | Stop reason: SDUPTHER

## 2019-06-10 RX ORDER — PANTOPRAZOLE SODIUM 40 MG/1
40 TABLET, DELAYED RELEASE ORAL DAILY
COMMUNITY
End: 2019-06-10 | Stop reason: SDUPTHER

## 2019-06-10 RX ORDER — HYDRALAZINE HYDROCHLORIDE 100 MG/1
TABLET, FILM COATED ORAL
COMMUNITY
End: 2019-06-10 | Stop reason: SDUPTHER

## 2019-06-10 RX ORDER — GLIPIZIDE 2.5 MG/1
2.5 TABLET, EXTENDED RELEASE ORAL DAILY
Qty: 90 TAB | Refills: 5 | Status: SHIPPED | OUTPATIENT
Start: 2019-06-10

## 2019-06-10 RX ORDER — CETIRIZINE HCL 10 MG
10 TABLET ORAL DAILY
Qty: 90 TAB | Refills: 5 | Status: SHIPPED | OUTPATIENT
Start: 2019-06-10

## 2019-06-10 NOTE — PROGRESS NOTES
Jordon Fontanez  Identified pt with two pt identifiers(name and ). Chief Complaint   Patient presents with    [facility]    Back Pain    Medication Refill       Reviewed record In preparation for visit and have obtained necessary documentation. 1. Have you been to the ER, urgent care clinic or hospitalized since your last visit? No     2. Have you seen or consulted any other health care providers outside of the [address] [place] [address] since your last visit? Include any pap smears or colon screening. No    Vitals reviewed with provider.     Health Maintenance reviewed:     Health Maintenance Due   Topic    LIPID PANEL Q1     FOOT EXAM Q1     MICROALBUMIN Q1     HEMOGLOBIN A1C Q6M      3 most recent PHQ Screens 6/10/2019   Little interest or pleasure in doing things Not at all     Temp Readings from Last 3 Encounters:   06/10/19 97.7 °F (36.5 °C) (Oral)   18 98.6 °F (37 °C) (Oral)   18 98.3 °F (36.8 °C)   the  BP Readings from Last 3 Encounters:   06/10/19 (!) 177/104   18 (!) 200/110   18 (!) 187/113   g m  Pulse Readings from Last 3 Encounters:   06/10/19 84   18 82   18 90    6/2  Vitals:    06/10/19 1131   BP: (!) 177/104   Pulse: 84   Resp: 18   Temp: 97.7 °F (36.5 °C)   TempSrc: Oral   SpO2: 98%   Weight: 184 lb 9.6 oz (83.7 kg)   Height: 5' 9\" (1.753 m)   PainSc:   5   PainLoc: Back      PRIMARY LEARNER Patient   HIGHEST LEVEL OF EDUCATION - PRIMARY LEARNER  GRADUATED HIGH SCHOOL OR GED   BARRIERS PRIMARY LEARNER NONE   CO-LEARNER CAREGIVER No   PRIMARY LANGUAGE ENGLISH   LEARNER PREFERENCE PRIMARY LISTENING   ANSWERED BY patient   RELATIONSHIP SELF

## 2019-06-10 NOTE — PATIENT INSTRUCTIONS
Restart all blood pressures BUT your clonidine. We will re-evaluate if you need the clonidine in 1 month. Acute Low Back Pain:     Exercises  Your Care Instructions  Here are some examples of typical rehabilitation exercises for your condition. Start each exercise slowly. Ease off the exercise if you start to have pain. Your doctor or physical therapist will tell you when you can start these exercises and which ones will work best for you. When you are not being active, find a comfortable position for rest. Some people are comfortable on the floor or a medium-firm bed with a small pillow under their head and another under their knees. Some people prefer to lie on their side with a pillow between their knees. Don't stay in one position for too long. Take short walks (10 to 20 minutes) every 2 to 3 hours. Avoid slopes, hills, and stairs until you feel better. Walk only distances you can manage without pain, especially leg pain. How to do the exercises  Back stretches    1. Get down on your hands and knees on the floor. 2. Relax your head and allow it to droop. Round your back up toward the ceiling until you feel a nice stretch in your upper, middle, and lower back. Hold this stretch for as long as it feels comfortable, or about 15 to 30 seconds. 3. Return to the starting position with a flat back while you are on your hands and knees. 4. Let your back sway by pressing your stomach toward the floor. Lift your buttocks toward the ceiling. 5. Hold this position for 15 to 30 seconds. 6. Repeat 2 to 4 times. Follow-up care is a key part of your treatment and safety. Be sure to make and go to all appointments, and call your doctor if you are having problems. It's also a good idea to know your test results and keep a list of the medicines you take. Where can you learn more? Go to http://merari-reva.info/.   Enter U658 in the search box to learn more about \"Acute Low Back Pain: Exercises. \"  Current as of: September 20, 2018  Content Version: 11.9  © 4436-6278 [employer]. Care instructions adapted under license by [employer] (which disclaims liability or warranty for this information). If you have questions about a medical condition or this instruction, always ask your healthcare professional. [address] any warranty or liability for your use of this information. Learning About Benefits From Quitting Smoking  How does quitting smoking make you healthier? If you're thinking about quitting smoking, you may have a few reasons to be smoke-free. Your health may be one of them. · When you quit smoking, you lower your risks for cancer, lung disease, heart attack, stroke, blood vessel disease, and blindness from macular degeneration. · When you're smoke-free, you get sick less often, and you heal faster. You are less likely to get colds, flu, bronchitis, and pneumonia. · As a nonsmoker, you may find that your mood is better and you are less stressed. When and how will you feel healthier? Quitting has real health benefits that start from day 1 of being smoke-free. And the longer you stay smoke-free, the healthier you get and the better you feel. The first hours  · After just 20 minutes, your blood pressure and heart rate go down. That means there's less stress on your heart and blood vessels. · Within 12 hours, the level of carbon monoxide in your blood drops back to normal. That makes room for more oxygen. With more oxygen in your body, you may notice that you have more energy than when you smoked. After 2 weeks  · Your lungs start to work better. · Your risk of heart attack starts to drop. After 1 month  · When your lungs are clear, you cough less and breathe deeper, so it's easier to be active. · Your sense of taste and smell return. That means you can enjoy food more than you have since you started smoking.   Over the years  · After 1 year, your risk of heart disease is half what it would be if you kept smoking. · After 5 years, your risk of stroke starts to shrink. Within a few years after that, it's about the same as if you'd never smoked. · After 10 years, your risk of dying from lung cancer is cut by about half. And your risk for many other types of cancer is lower too. How would quitting help others in your life? When you quit smoking, you improve the health of everyone who now breathes in your smoke. · Their heart, lung, and cancer risks drop, much like yours. · They are sick less. For babies and small children, living smoke-free means they're less likely to have ear infections, pneumonia, and bronchitis. · If you're a woman who is or will be pregnant someday, quitting smoking means a healthier . · Children who are close to you are less likely to become adult smokers. Where can you learn more? Go to http://merari-reva.info/. Enter [id number] [id number] [id number] 11 in the search box to learn more about \"Learning About Benefits From Quitting Smoking. \"  Current as of: 2018  Content Version: 11.9  © 3164-9604 [employer]. Care instructions adapted under license by [employer] (which disclaims liability or warranty for this information). If you have questions about a medical condition or this instruction, always ask your healthcare professional. [place] [address] any warranty or liability for your use of this information. Learning About Meal Planning for Diabetes  Why plan your meals? Meal planning can be a key part of managing diabetes. Planning meals and snacks with the right balance of carbohydrate, protein, and fat can help you keep your blood sugar at the target level you set with your doctor. You don't have to eat special foods. You can eat what your family eats, including sweets once in a while.  But you do have to pay attention to how often you eat and how much you eat of certain foods. You may want to work with a dietitian or a certified diabetes educator. He or she can give you tips and meal ideas and can answer your questions about meal planning. This health professional can also help you reach a healthy weight if that is one of your goals. What plan is right for you? Your dietitian or diabetes educator may suggest that you start with the plate format or carbohydrate counting. The plate format  The plate format is a simple way to help you manage how you eat. You plan meals by learning how much space each food should take on a plate. Using the plate format helps you spread carbohydrate throughout the day. It can make it easier to keep your blood sugar level within your target range. It also helps you see if you're eating healthy portion sizes. To use the plate format, you put non-starchy vegetables on half your plate. Add meat or meat substitutes on one-quarter of the plate. Put a grain or starchy vegetable (such as brown rice or a potato) on the final quarter of the plate. You can add a small piece of fruit and some low-fat or fat-free milk or yogurt, depending on your carbohydrate goal for each meal.  Here are some tips for using the plate format:  · Make sure that you are not using an oversized plate. A 9-inch plate is best. Many restaurants use larger plates. · Get used to using the plate format at home. Then you can use it when you eat out. · Write down your questions about using the plate format. Talk to your doctor, a dietitian, or a diabetes educator about your concerns. Carbohydrate counting  With carbohydrate counting, you plan meals based on the amount of carbohydrate in each food. Carbohydrate raises blood sugar higher and more quickly than any other nutrient. It is found in desserts, breads and cereals, and fruit. It's also found in starchy vegetables such as potatoes and corn, grains such as rice and pasta, and milk and yogurt. Spreading carbohydrate throughout the day helps keep your blood sugar levels within your target range. Your daily amount depends on several things, including your weight, how active you are, which diabetes medicines you take, and what your goals are for your blood sugar levels. A registered dietitian or diabetes educator can help you plan how much carbohydrate to include in each meal and snack. A guideline for your daily amount of carbohydrate is:  · 45 to 60 grams at each meal. That's about the same as 3 to 4 carbohydrate servings. · 15 to 20 grams at each snack. That's about the same as 1 carbohydrate serving. The Nutrition Facts label on packaged foods tells you how much carbohydrate is in a serving of the food. First, look at the serving size on the food label. Is that the amount you eat in a serving? All of the nutrition information on a food label is based on that serving size. So if you eat more or less than that, you'll need to adjust the other numbers. Total carbohydrate is the next thing you need to look for on the label. If you count carbohydrate servings, one serving of carbohydrate is 15 grams. For foods that don't come with labels, such as fresh fruits and vegetables, you'll need a guide that lists carbohydrate in these foods. Ask your doctor, dietitian, or diabetes educator about books or other nutrition guides you can use. If you take insulin, you need to know how many grams of carbohydrate are in a meal. This lets you know how much rapid-acting insulin to take before you eat. If you use an insulin pump, you get a constant rate of insulin during the day. So the pump must be programmed at meals to give you extra insulin to cover the rise in blood sugar after meals. When you know how much carbohydrate you will eat, you can take the right amount of insulin. Or, if you always use the same amount of insulin, you need to make sure that you eat the same amount of carbohydrate at meals.   If you need more help to understand carbohydrate counting and food labels, ask your doctor, dietitian, or diabetes educator. How do you get started with meal planning? Here are some tips to get started:  · Plan your meals a week at a time. Don't forget to include snacks too. · Use cookbooks or online recipes to plan several main meals. Plan some quick meals for busy nights. You also can double some recipes that freeze well. Then you can save half for other busy nights when you don't have time to cook. · Make sure you have the ingredients you need for your recipes. If you're running low on basic items, put these items on your shopping list too. · List foods that you use to make breakfasts, lunches, and snacks. List plenty of fruits and vegetables. · Post this list on the refrigerator. Add to it as you think of more things you need. · Take the list to the store to do your weekly shopping. Follow-up care is a key part of your treatment and safety. Be sure to make and go to all appointments, and call your doctor if you are having problems. It's also a good idea to know your test results and keep a list of the medicines you take. Where can you learn more? Go to http://merari-reva.info/. Dia Gracia in the search box to learn more about \"Learning About Meal Planning for Diabetes. \"  Current as of: July 25, 2018  Content Version: 11.9  © 5444-5354 [employer]. Care instructions adapted under license by [employer] (which disclaims liability or warranty for this information). If you have questions about a medical condition or this instruction, always ask your healthcare professional. [address] any warranty or liability for your use of this information.

## 2019-06-10 NOTE — PROGRESS NOTES
HPI  Maya Bartlett is a 38y.o. year old male patient of Ag Richards NP who presents with c/o est care. Pt has history of has Accelerated hypertension, Hyponatremia, Abnormal LFTs, Hepatitis C, Alcohol abuse, daily use, Foot fracture, left, Type 2 diabetes with nephropathy (HCC), Chronic kidney disease, Tobacco abuse, Cellulitis of left lower leg, Hypertension, Liver disease, H/O ETOH abuse, Obesity (BMI 30.0-34.9), Phantom pain ([address] Diabetic polyneuropathy associated with type 2 diabetes mellitus ([address] CKD stage 3 due to type 2 diabetes mellitus ([facility] Anemia of chronic disease, Hx of BKA, left (HCC), Chronic edema, Hypercholesteremia, and Gastroesophageal reflux disease on their problem list..    Pt here to est care. Previously following with Dr. Shauna Kimbrough and most recently Dr. Hattie Welsh at [facility], [facility], last seen March 2019. Has been out of all medications except carvedilol x 1 month. No recent blood work. Also c/o back pain, pain is 5/10. HTN- amlodipine 10mg, carvdeilol 25 BID, clonidine 0.3mg TID, hydralazine 100mg TID, doesn't check BP at home  Bumex 2mg daily for swelling  DM 2 w/polyneuropathy-  Glipizide 2.5mg once daily, doesn't check BS, has stuff at home to do it  CKD- Was on dialysis in [place] for 5 months Nov 2018 until 3 mos ago- was told kidneys were doing ok and could stop. Hasn't seen a nephrologist since. Liver Disease, hx ETOH abuse and Hep C- did not f/u with liver specialist, states Hep C has never been treated  Phantom pain- gabapentin 800mg twice daily for phantom pain and neuropathy- states not helping enough  XOL- lovastatin   GERD- pantoprazole 40mg    Was in the hospital last November for right foot osteomyelitis, amputated pinky toe, transferred to [facility] due to renal failure and infection, dc'd in December. Follows with Dr. Mago Shearer who did his left BKA d/t charcot foot, just received his prosthetic leg two weeks ago. Was in a wheelchair prior. ETOH: last use 3 mos ago, former heavy use  Nicotine: Smokes 1.5 PPD x 20 years    C/o lower back pain x two weeks ago when he got his prosthetic leg. Hurts in the middle, describes as achy, hurts more if he walks up an incline. Tried tylenol 1000 which didn't help. Denies radiating pain. Prior to two weeks ago he was in a wheelchair since his hospital d/c for BKA. Denies chest pain, chest pressure, or palpitations. Denies SOB, orthopnea, or PND. Gets occ lower extremity swelling for which bumex helps. Denies HA, dizziness, or blurred vision. Denies N/V/D, constipation, heartburn, or abd pain. Denies BRBPR, melena, or blood in urine. Records Review:  [facility] Course 11/2018: (verbatim from chart)  Acute Renal Failure  Pt was transferred to [facility] for management of acute renal failure, which he developed after his procedure for osteomyelitis. 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 He was subsequently started on IV steroids x3 days and 5 sessions of plasmapheresis, completing session #3/5 on 11/17/2018. Here, he was found to be PR3 negative and he had a very high HCV viral load (4.56million), raising concern for HCV associated membranoproliferative glomerulonephritis as the cause of his renal failure instead of ANCA vasculitis. After discussion with pathology, nephrology, and GI, steroids and further plasmapheresis sessions were stopped, with the focus shifting towards managing his HCV infection. He was otherwise continued on a MWF HD schedule, and also had a permacath successfully placed on 11/20/2018. He is medically discharged and physical discharge is pending HD placement at outpatient facility which will likely be [address] [place] [address].      Active HCV Infection  Pt was found to have high HCV viral load as detailed above, rasing concern that HCV-associated MPGN was the cause of his renal failure. GI was consulted regarding management. Notably, patient lacks insurance, thereby making it challenging to obtain outpatient HCV treatment. HCV genotyping revealed HCV 1a. He is being referred to an outpatient facility to seek treatment post-discharge. Right Foot Osteomyelitis  Pt was transferred here having a recent right fifth toe amputation at [facility] for osteomyeltiis. Wound cultures grew MSSA, Enterococcus casseliflavus, and Diphteroids. He was initially placed on Vancomycin, but then changed to Linezolid in the setting of worsening renal function. Infectious workup here, including Blood cultures, remained negative. He completed his course of Linezolid on 11/18/2018, and his pain was managed with Tylenol 650mg q4H PRN and Percocet 5-325mg q4H PRN. Type 2 Diabetes Mellitus  On admission, pt's Hb A1C 6.5%. He was recently on Sitagliptin at home. He was ultimately discharged without insulin in the setting of liver failure with a good A1C. He should be followed closely outpatient to see if any PO meds need to be restarted. Hypertension  He was managed with his home Amlodipine 10mg daily, Carvedilol 25mg BID, and Terazosin 1mg nightly. HCTZ was held due to his renal issues. [facility]:  Admission Date: 12/7/2018 Discharge Date: 12/24/2018    Admission Status: Inpatient    Discharge Diagnosis:  Pt was discharged with vanco at HD and oral levaquin until January 21st, 2019.     1. Pasturella bacteremia ;  - on vancomycin and Rocephin. - per ID, plan for vancomycin 1 g after hemodialysis, Rocephin 2 g daily till 01/21/2018. Patient is status post 5th metatarsal amputation. Appreciate input from Infectious Disease. Patient can do vancomycin at dialysis and Levaquin by mouth. Plan on discharge once cleared by Podiatry.   2. Cellulitis with anterior tibial leg ulcer with possible underlying osteomyelitis.;left leg cellulitis, Charcot foot.-a right leg status post amputation of the 5th toe with amputation stump infection.    - Wound care  - as 1. Patient is now draining pus from the left leg wound. CT did not show any evidence of abscess or drainable abscess. 3. Possible acute blood loss anemia; stable plan monitor  4. Pseudomembranous colitis;-  -vancomycin p.o. discontinued after C diff negative, pathology report reviewed by GI, recommended a repeat colonoscopy in 1 week  - Required transfusion 1 unit pRBC 12/8/18.   - change IV PPI to p.o.  5. End-stage renal disease:   - on dialysis. Nephrology following. Plan per Nephrology  6 Diabetes mellitus 2 ; fairly controlled. Plan continue same  7. Essential hypertension; not well controlled. Plan increase amlodipine to 10            3 most recent PHQ Screens 6/10/2019   Little interest or pleasure in doing things Not at all   Feeling down, depressed, irritable, or hopeless Not at all   Total Score PHQ 2 0         Patient Active Problem List   Diagnosis Code    Accelerated hypertension I10    Hyponatremia E87.1    Abnormal LFTs R94.5    Hepatitis C B19.20    Alcohol abuse, daily use F10.10    Foot fracture, left S92.902A    Type 2 diabetes with nephropathy (HCC) E11.21    Chronic kidney disease N18.9    Tobacco abuse Z72.0    Cellulitis of left lower leg L03. 80    Hypertension I10    Liver disease K76.9    H/O ETOH abuse Z87.898    Obesity (BMI 30.0-34. 9) E66.9    Phantom pain ([address] G54.6     Past Medical History:   Diagnosis Date    Cellulitis of left lower leg 11/2013    Chronic kidney disease     Stage 2/3 - does not see Nephrologist, hypertensive nephropathy    Diabetes ([address]     type 2    GERD (gastroesophageal reflux disease)     H/O ETOH abuse     Hepatitis C     Hypercholesteremia     Hypertension     Liver disease     Cirrohsis and hep C    Obesity (BMI 30.0-34. 9)     Pancreatitis     Phantom pain ([address]     Tobacco abuse      Past Surgical History:   Procedure Laterality Date    HX APPENDECTOMY      HX ORTHOPAEDIC Right 2011    infected cyst removed from right leg in [address] History     Socioeconomic History    Marital status: SINGLE     Spouse name: Not on file    Number of children: Not on file    Years of education: Not on file    Highest education level: Not on file   Tobacco Use    Smoking status: Current Every Day Smoker     Packs/day: 1.00    Smokeless tobacco: Never Used    Tobacco comment: tried nicotine patch previously   Substance and Sexual Activity    Alcohol use: No     Comment: previous heavy use, last ETOH 3 months ago    Drug use: No     Comment: quit IV heroin 1997    Sexual activity: Never     Family History   Problem Relation Age of Onset    Diabetes Mother         legally blind    Heart Disease Mother         CHF    Kidney Disease Mother         on dialysis    Diabetes Father     Stroke Sister     Other Sister         acuña acuña     Allergies   Allergen Reactions    Lisinopril Photosensitivity and Vertigo       MEDICATIONS  Current Outpatient Medications   Medication Sig    bumetanide (BUMEX) 2 mg tablet Take 1 Tab by mouth daily.  hydrALAZINE (APRESOLINE) 100 mg tablet Take 1 Tab by mouth three (3) times daily.  glipiZIDE SR (GLUCOTROL XL) 2.5 mg CR tablet Take 1 Tab by mouth daily.  lovastatin (MEVACOR) 40 mg tablet Take 1 Tab by mouth nightly.  pantoprazole (PROTONIX) 40 mg tablet Take 1 Tab by mouth daily.  cetirizine (ZYRTEC) 10 mg tablet Take 1 Tab by mouth daily.  amLODIPine (NORVASC) 10 mg tablet Take 1 Tab by mouth daily.  carvedilol (COREG) 25 mg tablet Take 1 Tab by mouth two (2) times a day.  gabapentin (NEURONTIN) 800 mg tablet Take 1 Tab by mouth three (3) times daily.  cloNIDine HCl (CATAPRES) 0.3 mg tablet Take 0.3 mg by mouth. No current facility-administered medications for this visit.         REVIEW OF SYSTEMS  Per HPI        Visit Vitals  BP (!) 160/95   Pulse 84   Temp 97.7 °F (36.5 °C) (Oral)   Resp 18   Ht 5' 9\" (1.753 m)   Wt 184 lb 9.6 oz (83.7 kg)   SpO2 98%   BMI 27.26 kg/m²         General: Well-developed, well-nourished. In no distress. A&O x 3. Head: Normocephalic, atraumatic. Eyes: Conjunctiva clear. Pupils equal, round, reactive to light. Extraocular movements intact. Ears/Nose:. Nares normal. Septum midline. Mouth/Throat: Lips, mucosa, and tongue normal. Oropharynx benign. Neck: Supple, symmetrical, trachea midline, no lymphadenopathy, no carotid bruits, no JVD, thyroid: not enlarged, symmetric, no tenderness/mass/nodules. Lungs: Clear to auscultation bilaterally. No crackles or wheezes. No use of accessory muscles. Speaks in full sentences without SOB. Chest Wall: No tenderness or deformity. Heart: RRR, normal S1 and S2, no murmur, click, rub, or gallop. Back: Symmetric. ROM intact. No CVA tenderness. No SI jt tenderness. Reports pain with spinal extension. Neurovasc: No edema appreciated. Musculoskeletal: Gait normal. Left BKA with prosthesis. Psychiatric: Normal mood and affect. Behavior is normal.       No results found for any visits on 06/10/19. ASSESSMENT and PLAN  Diagnoses and all orders for this visit:    Reviewed plan with Dr. Caleb Bui who agrees. 1. Type 2 diabetes mellitus with other circulatory complication, without long-term current use of insulin (HCC)  -     METABOLIC PANEL, COMPREHENSIVE  -     LIPID PANEL  -     HEMOGLOBIN A1C WITH EAG  -     glipiZIDE SR (GLUCOTROL XL) 2.5 mg CR tablet; Take 1 Tab by mouth daily. 2. Diabetic polyneuropathy associated with type 2 diabetes mellitus (HCC)  INCREASE gabapentin to 800mg TID (pt was taking BID)- cautioned drowsiness, decrease back down to BID if develops side effects  -     gabapentin (NEURONTIN) 800 mg tablet; Take 1 Tab by mouth three (3) times daily. 3. Chronic hepatitis C without hepatic coma (HCC)  -     REFERRAL TO LIVER HEPATOLOGY- for treatment    4.  CKD stage 3 due to type 2 diabetes mellitus ([facility]  -     REFERRAL TO NEPHROLOGY  Unclear when exactly pt stopped dialysis, do not have records or recent labs. 5. Essential hypertension  -     hydrALAZINE (APRESOLINE) 100 mg tablet; Take 1 Tab by mouth three (3) times daily. -     amLODIPine (NORVASC) 10 mg tablet; Take 1 Tab by mouth daily. -     carvedilol (COREG) 25 mg tablet; Take 1 Tab by mouth two (2) times a day. Not well controlled however pt has been out of his medications except for Coreg. Restart amlodipine and hydralazine -hold clonidine for now. 6. Tobacco abuse  Counseled on the importance of complete smoking cessation to reduce risk for heart disease, stroke, lung disease,  vascular disease, etc. and many cancers. 9. Anemia of chronic disease  -     CBC WITH AUTOMATED DIFF    10. Acute midline low back pain without sciatica  -     XR SPINE LUMB 2 OR 3 V; Future  Declined narcotic pain medication. Pt states he cannot take muscle relaxer's. Also not candidate for NSAIDS. Suspect acute pain r/t new use of prosthesis and altered gait- recommend PT if xrays normal    11. Routine eye exam  -     REFERRAL TO OPTOMETRY- pt has not had eye exam in 20+  years    12. Chronic edema  -     bumetanide (BUMEX) 2 mg tablet; Take 1 Tab by mouth daily. 13. Hypercholesteremia  -     lovastatin (MEVACOR) 40 mg tablet; Take 1 Tab by mouth nightly. 14. Gastroesophageal reflux disease, esophagitis presence not specified  Controlled  -     pantoprazole (PROTONIX) 40 mg tablet; Take 1 Tab by mouth daily. 15. History of ETOH abuse  Pt sober    Other orders  -     cetirizine (ZYRTEC) 10 mg tablet; Take 1 Tab by mouth daily. Patient Instructions   Restart all blood pressures BUT your clonidine. We will re-evaluate if you need the clonidine in 1 month. Acute Low Back Pain:     Exercises  Your Care Instructions  Here are some examples of typical rehabilitation exercises for your condition. Start each exercise slowly.  Ease off the exercise if you start to have pain. Your doctor or physical therapist will tell you when you can start these exercises and which ones will work best for you. When you are not being active, find a comfortable position for rest. Some people are comfortable on the floor or a medium-firm bed with a small pillow under their head and another under their knees. Some people prefer to lie on their side with a pillow between their knees. Don't stay in one position for too long. Take short walks (10 to 20 minutes) every 2 to 3 hours. Avoid slopes, hills, and stairs until you feel better. Walk only distances you can manage without pain, especially leg pain. How to do the exercises  Back stretches    1. Get down on your hands and knees on the floor. 2. Relax your head and allow it to droop. Round your back up toward the ceiling until you feel a nice stretch in your upper, middle, and lower back. Hold this stretch for as long as it feels comfortable, or about 15 to 30 seconds. 3. Return to the starting position with a flat back while you are on your hands and knees. 4. Let your back sway by pressing your stomach toward the floor. Lift your buttocks toward the ceiling. 5. Hold this position for 15 to 30 seconds. 6. Repeat 2 to 4 times. Follow-up care is a key part of your treatment and safety. Be sure to make and go to all appointments, and call your doctor if you are having problems. It's also a good idea to know your test results and keep a list of the medicines you take. Where can you learn more? Go to http://merari-reva.info/. Enter T992 in the search box to learn more about \"Acute Low Back Pain: Exercises. \"  Current as of: September 20, 2018  Content Version: 11.9  © 6680-7811 [employer]. Care instructions adapted under license by [employer] (which disclaims liability or warranty for this information).  If you have questions about a medical condition or this instruction, always ask your healthcare professional. [address] any warranty or liability for your use of this information. Learning About Benefits From Quitting Smoking  How does quitting smoking make you healthier? If you're thinking about quitting smoking, you may have a few reasons to be smoke-free. Your health may be one of them. · When you quit smoking, you lower your risks for cancer, lung disease, heart attack, stroke, blood vessel disease, and blindness from macular degeneration. · When you're smoke-free, you get sick less often, and you heal faster. You are less likely to get colds, flu, bronchitis, and pneumonia. · As a nonsmoker, you may find that your mood is better and you are less stressed. When and how will you feel healthier? Quitting has real health benefits that start from day 1 of being smoke-free. And the longer you stay smoke-free, the healthier you get and the better you feel. The first hours  · After just 20 minutes, your blood pressure and heart rate go down. That means there's less stress on your heart and blood vessels. · Within 12 hours, the level of carbon monoxide in your blood drops back to normal. That makes room for more oxygen. With more oxygen in your body, you may notice that you have more energy than when you smoked. After 2 weeks  · Your lungs start to work better. · Your risk of heart attack starts to drop. After 1 month  · When your lungs are clear, you cough less and breathe deeper, so it's easier to be active. · Your sense of taste and smell return. That means you can enjoy food more than you have since you started smoking. Over the years  · After 1 year, your risk of heart disease is half what it would be if you kept smoking. · After 5 years, your risk of stroke starts to shrink. Within a few years after that, it's about the same as if you'd never smoked. · After 10 years, your risk of dying from lung cancer is cut by about half.  And your risk for many other types of cancer is lower too. How would quitting help others in your life? When you quit smoking, you improve the health of everyone who now breathes in your smoke. · Their heart, lung, and cancer risks drop, much like yours. · They are sick less. For babies and small children, living smoke-free means they're less likely to have ear infections, pneumonia, and bronchitis. · If you're a woman who is or will be pregnant someday, quitting smoking means a healthier . · Children who are close to you are less likely to become adult smokers. Where can you learn more? Go to http://merariTetco Technologiesreva.info/. Enter 052 [id number] 72 11 in the search box to learn more about \"Learning About Benefits From Quitting Smoking. \"  Current as of: 2018  Content Version: 11.9  © 1721-5093 [employer]. Care instructions adapted under license by [employer] (which disclaims liability or warranty for this information). If you have questions about a medical condition or this instruction, always ask your healthcare professional. [place] [address] any warranty or liability for your use of this information. Learning About Meal Planning for Diabetes  Why plan your meals? Meal planning can be a key part of managing diabetes. Planning meals and snacks with the right balance of carbohydrate, protein, and fat can help you keep your blood sugar at the target level you set with your doctor. You don't have to eat special foods. You can eat what your family eats, including sweets once in a while. But you do have to pay attention to how often you eat and how much you eat of certain foods. You may want to work with a dietitian or a certified diabetes educator. He or she can give you tips and meal ideas and can answer your questions about meal planning. This health professional can also help you reach a healthy weight if that is one of your goals.   What plan is right for you?  Your dietitian or diabetes educator may suggest that you start with the plate format or carbohydrate counting. The plate format  The plate format is a simple way to help you manage how you eat. You plan meals by learning how much space each food should take on a plate. Using the plate format helps you spread carbohydrate throughout the day. It can make it easier to keep your blood sugar level within your target range. It also helps you see if you're eating healthy portion sizes. To use the plate format, you put non-starchy vegetables on half your plate. Add meat or meat substitutes on one-quarter of the plate. Put a grain or starchy vegetable (such as brown rice or a potato) on the final quarter of the plate. You can add a small piece of fruit and some low-fat or fat-free milk or yogurt, depending on your carbohydrate goal for each meal.  Here are some tips for using the plate format:  · Make sure that you are not using an oversized plate. A 9-inch plate is best. Many restaurants use larger plates. · Get used to using the plate format at home. Then you can use it when you eat out. · Write down your questions about using the plate format. Talk to your doctor, a dietitian, or a diabetes educator about your concerns. Carbohydrate counting  With carbohydrate counting, you plan meals based on the amount of carbohydrate in each food. Carbohydrate raises blood sugar higher and more quickly than any other nutrient. It is found in desserts, breads and cereals, and fruit. It's also found in starchy vegetables such as potatoes and corn, grains such as rice and pasta, and milk and yogurt. Spreading carbohydrate throughout the day helps keep your blood sugar levels within your target range. Your daily amount depends on several things, including your weight, how active you are, which diabetes medicines you take, and what your goals are for your blood sugar levels.  A registered dietitian or diabetes educator can help you plan how much carbohydrate to include in each meal and snack. A guideline for your daily amount of carbohydrate is:  · 45 to 60 grams at each meal. That's about the same as 3 to 4 carbohydrate servings. · 15 to 20 grams at each snack. That's about the same as 1 carbohydrate serving. The Nutrition Facts label on packaged foods tells you how much carbohydrate is in a serving of the food. First, look at the serving size on the food label. Is that the amount you eat in a serving? All of the nutrition information on a food label is based on that serving size. So if you eat more or less than that, you'll need to adjust the other numbers. Total carbohydrate is the next thing you need to look for on the label. If you count carbohydrate servings, one serving of carbohydrate is 15 grams. For foods that don't come with labels, such as fresh fruits and vegetables, you'll need a guide that lists carbohydrate in these foods. Ask your doctor, dietitian, or diabetes educator about books or other nutrition guides you can use. If you take insulin, you need to know how many grams of carbohydrate are in a meal. This lets you know how much rapid-acting insulin to take before you eat. If you use an insulin pump, you get a constant rate of insulin during the day. So the pump must be programmed at meals to give you extra insulin to cover the rise in blood sugar after meals. When you know how much carbohydrate you will eat, you can take the right amount of insulin. Or, if you always use the same amount of insulin, you need to make sure that you eat the same amount of carbohydrate at meals. If you need more help to understand carbohydrate counting and food labels, ask your doctor, dietitian, or diabetes educator. How do you get started with meal planning? Here are some tips to get started:  · Plan your meals a week at a time. Don't forget to include snacks too. · Use cookbooks or online recipes to plan several main meals.  Plan some quick meals for busy nights. You also can double some recipes that freeze well. Then you can save half for other busy nights when you don't have time to cook. · Make sure you have the ingredients you need for your recipes. If you're running low on basic items, put these items on your shopping list too. · List foods that you use to make breakfasts, lunches, and snacks. List plenty of fruits and vegetables. · Post this list on the refrigerator. Add to it as you think of more things you need. · Take the list to the store to do your weekly shopping. Follow-up care is a key part of your treatment and safety. Be sure to make and go to all appointments, and call your doctor if you are having problems. It's also a good idea to know your test results and keep a list of the medicines you take. Where can you learn more? Go to http://[email or web address].info/. Dia Angry in the search box to learn more about \"Learning About Meal Planning for Diabetes. \"  Current as of: July 25, 2018  Content Version: 11.9  © 4513-1953 [employer]. Care instructions adapted under license by [employer] (which disclaims liability or warranty for this information). If you have questions about a medical condition or this instruction, always ask your healthcare professional. [place] [address] any warranty or liability for your use of this information. Health Maintenance Due   Topic Date Due    LIPID PANEL Q1  1976    FOOT EXAM Q1  02/24/1986    MICROALBUMIN Q1  02/24/1986    HEMOGLOBIN A1C Q6M  12/09/2018   All to be done today. I have discussed the diagnosis with the patient and the intended plan as seen in the above orders. Patient is in agreement. The patient has received an after-visit summary and questions were answered concerning future plans. I have discussed medication side effects and warnings with the patient as well.  Warning signs for the above conditions were discussed including when to call our office or go to the emergency room. The nurse provided the patient and/or family with advanced directive information if needed and encouraged the patient to provide a copy to the office when available.

## 2019-06-11 ENCOUNTER — TELEPHONE (OUTPATIENT)
Dept: INTERNAL MEDICINE CLINIC | Facility: CLINIC | Age: 44
End: 2019-06-11

## 2019-06-11 DIAGNOSIS — N18.30 CKD STAGE 3 DUE TO TYPE 2 DIABETES MELLITUS (HCC): ICD-10-CM

## 2019-06-11 DIAGNOSIS — D63.8 ANEMIA OF CHRONIC DISEASE: Primary | ICD-10-CM

## 2019-06-11 DIAGNOSIS — E87.8 BLOOD CO2 DECREASED: ICD-10-CM

## 2019-06-11 DIAGNOSIS — R74.8 ELEVATED ALKALINE PHOSPHATASE LEVEL: ICD-10-CM

## 2019-06-11 DIAGNOSIS — E11.22 CKD STAGE 3 DUE TO TYPE 2 DIABETES MELLITUS (HCC): ICD-10-CM

## 2019-06-11 LAB
ALBUMIN SERPL-MCNC: 2.6 G/DL (ref 3.5–5.5)
ALBUMIN/GLOB SERPL: 1.2 {RATIO} (ref 1.2–2.2)
ALP SERPL-CCNC: 199 IU/L (ref 39–117)
ALT SERPL-CCNC: 33 IU/L (ref 0–44)
AST SERPL-CCNC: 36 IU/L (ref 0–40)
BILIRUB SERPL-MCNC: <0.2 MG/DL (ref 0–1.2)
BUN SERPL-MCNC: 38 MG/DL (ref 6–24)
BUN/CREAT SERPL: 20 (ref 9–20)
CALCIUM SERPL-MCNC: 7.9 MG/DL (ref 8.7–10.2)
CHLORIDE SERPL-SCNC: 113 MMOL/L (ref 96–106)
CHOLEST SERPL-MCNC: 160 MG/DL (ref 100–199)
CO2 SERPL-SCNC: 14 MMOL/L (ref 20–29)
CREAT SERPL-MCNC: 1.9 MG/DL (ref 0.76–1.27)
EST. AVERAGE GLUCOSE BLD GHB EST-MCNC: 123 MG/DL
GLOBULIN SER CALC-MCNC: 2.1 G/DL (ref 1.5–4.5)
GLUCOSE SERPL-MCNC: 217 MG/DL (ref 65–99)
HBA1C MFR BLD: 5.9 % (ref 4.8–5.6)
HDLC SERPL-MCNC: 76 MG/DL
LDLC SERPL CALC-MCNC: 66 MG/DL (ref 0–99)
POTASSIUM SERPL-SCNC: 4.2 MMOL/L (ref 3.5–5.2)
PROT SERPL-MCNC: 4.7 G/DL (ref 6–8.5)
SODIUM SERPL-SCNC: 137 MMOL/L (ref 134–144)
TRIGL SERPL-MCNC: 89 MG/DL (ref 0–149)
VLDLC SERPL CALC-MCNC: 18 MG/DL (ref 5–40)

## 2019-06-11 NOTE — PROGRESS NOTES
Pls let pt know his metabolic panel is abnormal and shows worsening kidney function- we are working on getting his Nephrology appt ASAP for further evaluation. I would also like him to return on Friday for a repeat BMP and addtl labs including a CBC to check his anemia. His A1C is great at 5.9.

## 2019-06-11 NOTE — TELEPHONE ENCOUNTER
----- Message from Shannan Galvan sent at 6/11/2019 10:36 AM EDT -----  Regarding: [place]/telephone  Pt is returning a call from yesterday. Pts number is [phone].

## 2019-07-22 ENCOUNTER — DOCUMENTATION ONLY (OUTPATIENT)
Dept: INTERNAL MEDICINE CLINIC | Facility: CLINIC | Age: 44
End: 2019-07-22

## 2021-08-03 PROBLEM — I10 HYPERTENSION: Status: RESOLVED | Noted: 2021-08-03 | Resolved: 2021-08-03
